# Patient Record
Sex: MALE | Race: WHITE | NOT HISPANIC OR LATINO | Employment: OTHER | URBAN - METROPOLITAN AREA
[De-identification: names, ages, dates, MRNs, and addresses within clinical notes are randomized per-mention and may not be internally consistent; named-entity substitution may affect disease eponyms.]

---

## 2020-02-19 ENCOUNTER — HOSPITAL ENCOUNTER (OUTPATIENT)
Dept: OTHER | Facility: HOSPITAL | Age: 75
Discharge: HOME | End: 2020-02-19
Attending: FAMILY MEDICINE

## 2020-02-26 ENCOUNTER — OFFICE (OUTPATIENT)
Dept: RURAL CLINIC 3 | Facility: CLINIC | Age: 75
End: 2020-02-26

## 2020-02-26 VITALS
HEIGHT: 68 IN | HEART RATE: 64 BPM | SYSTOLIC BLOOD PRESSURE: 137 MMHG | DIASTOLIC BLOOD PRESSURE: 75 MMHG | WEIGHT: 194 LBS

## 2020-02-26 DIAGNOSIS — R14.0 ABDOMINAL DISTENSION (GASEOUS): ICD-10-CM

## 2020-02-26 DIAGNOSIS — R19.4 CHANGE IN BOWEL HABIT: ICD-10-CM

## 2020-02-26 DIAGNOSIS — K21.9 GASTRO-ESOPHAGEAL REFLUX DISEASE WITHOUT ESOPHAGITIS: ICD-10-CM

## 2020-02-26 DIAGNOSIS — I10 ESSENTIAL (PRIMARY) HYPERTENSION: ICD-10-CM

## 2020-02-26 DIAGNOSIS — R07.9 CHEST PAIN, UNSPECIFIED: ICD-10-CM

## 2020-02-26 PROCEDURE — 99203 OFFICE O/P NEW LOW 30 MIN: CPT | Performed by: INTERNAL MEDICINE

## 2020-05-19 ENCOUNTER — TELEHEALTH PROVIDED OTHER THAN IN PATIENT'S HOME (OUTPATIENT)
Dept: URBAN - METROPOLITAN AREA TELEHEALTH 4 | Facility: TELEHEALTH | Age: 75
End: 2020-05-19

## 2020-05-19 DIAGNOSIS — B96.81 HELICOBACTER PYLORI [H. PYLORI] AS THE CAUSE OF DISEASES CLA: ICD-10-CM

## 2020-05-19 DIAGNOSIS — K21.9 GASTRO-ESOPHAGEAL REFLUX DISEASE WITHOUT ESOPHAGITIS: ICD-10-CM

## 2020-05-19 PROCEDURE — G2012 BRIEF CHECK IN BY MD/QHP: HCPCS | Performed by: INTERNAL MEDICINE

## 2020-10-12 ENCOUNTER — HOSPITAL ENCOUNTER (OUTPATIENT)
Dept: OTHER | Facility: HOSPITAL | Age: 75
Discharge: HOME | End: 2020-10-12
Attending: NURSE PRACTITIONER

## 2020-12-16 ENCOUNTER — HOSPITAL ENCOUNTER (OUTPATIENT)
Dept: OTHER | Facility: HOSPITAL | Age: 75
Discharge: HOME | End: 2020-12-16
Attending: OTOLARYNGOLOGY

## 2021-04-27 ENCOUNTER — HOSPITAL ENCOUNTER (OUTPATIENT)
Dept: OTHER | Facility: HOSPITAL | Age: 76
Discharge: HOME | End: 2021-04-27
Attending: FAMILY MEDICINE

## 2021-04-28 ENCOUNTER — HOSPITAL ENCOUNTER (OUTPATIENT)
Dept: OTHER | Facility: HOSPITAL | Age: 76
Discharge: HOME | End: 2021-04-28
Attending: FAMILY MEDICINE

## 2021-05-04 ENCOUNTER — TELEPHONE (OUTPATIENT)
Dept: NEUROSURGERY | Facility: CLINIC | Age: 76
End: 2021-05-04

## 2021-05-10 ENCOUNTER — TELEPHONE (OUTPATIENT)
Dept: NEUROSURGERY | Facility: CLINIC | Age: 76
End: 2021-05-10

## 2021-05-10 NOTE — TELEPHONE ENCOUNTER
S/W Mrs. Ayala and informed her that Mr. Ayala has an appointment with Dr. Dubon on 06.22.2021 at 12:45pm    ----- Message from Lionel Dubon MD sent at 5/10/2021 11:28 AM EDT -----  Regarding: RE: Review  Okay with me to see him but it may need to be next available.  ----- Message -----  From: Sindi Hassan MA  Sent: 5/10/2021   9:34 AM EDT  To: Lionel Dubon MD  Subject: FW: Review                                       Dr. Dubon please advise  ----- Message -----  From: Kay Curran MA  Sent: 5/10/2021   9:15 AM EDT  To: Sindi Hassan MA  Subject: Review                                           Dx: Other intervertebral disc displacement, lumbar region    Last seen 08-05-15    Lumbar MRI @ [facility] 04-27-21

## 2021-06-21 ENCOUNTER — TELEPHONE (OUTPATIENT)
Dept: NEUROSURGERY | Facility: CLINIC | Age: 76
End: 2021-06-21

## 2021-06-21 NOTE — TELEPHONE ENCOUNTER
S/W Mrs. Ayala and informed her that they need to bring a disc from [facility] with Mr. Ayala's MRI on it to his appointment with Dr. Dubon on 06.22.2021

## 2021-06-21 NOTE — PROGRESS NOTES
Subjective   Patient ID: Lacho Ayala is a 76 y.o. male is being seen for consultation today at the request of Iker Artis MD for lumbar intervertebral disc displacement. Patient had a MRI Lumbar on 04.27.2021 at [facility].     Treatment: no recent treatment    Today patient states that he was previously in severe pain, but the pain has subsided. Patient denies weakness in B/L lower extremities.     Patient, Provider, and MA are all wearing a mask in our office today.     History of Present Illness    This patient has been having pain in his back with radiation into his right leg.  This problem originally began at the beginning of April.  Subsequent to that he had really severe pain for several weeks.  About mid to late May the pain began to get better and now he is not having as much pain.  He initially was walking quite a bit but has not been walking as much here recently because he does not want to bring the pain back.  He does note that if he takes it easy he does not have very much pain.  When the pain is severe it was in his right lower back and radiated into his right leg along the medial thigh medial calf down to the foot.  The left leg was okay.  He has no difficulty with bowel bladder control or other associated symptoms.  He has had no specific treatment so far.    The following portions of the patient's history were reviewed and updated as appropriate: allergies, current medications, past family history, past medical history, past social history, past surgical history and problem list.    Review of Systems   Constitutional: Negative for chills and fever.   HENT: Negative for congestion.    Genitourinary: Negative for difficulty urinating and dysuria.   Musculoskeletal: Positive for back pain. Negative for gait problem and myalgias.   Neurological: Negative for weakness and numbness.       I have reviewed the review of systems as documented by my MA.      Objective     Vitals:    06/22/21  "1157   BP: 140/82   Cuff Size: Adult   Pulse: 84   Temp: 97.8 °F (36.6 °C)   Weight: 80.7 kg (178 lb)   Height: 172.7 cm (68\")     Body mass index is 27.06 kg/m².      Physical Exam  Constitutional:       Appearance: He is well-developed.   HENT:      Head: Normocephalic and atraumatic.   Eyes:      Extraocular Movements: EOM normal.      Conjunctiva/sclera: Conjunctivae normal.      Pupils: Pupils are equal, round, and reactive to light.   Neck:      Vascular: No carotid bruit.   Neurological:      Mental Status: He is oriented to person, place, and time.      Coordination: Finger-Nose-Finger Test and Heel to Shin Test normal.      Gait: Gait is intact.      Deep Tendon Reflexes:      Reflex Scores:       Tricep reflexes are 2+ on the right side and 2+ on the left side.       Bicep reflexes are 2+ on the right side and 2+ on the left side.       Brachioradialis reflexes are 2+ on the right side and 2+ on the left side.       Patellar reflexes are 2+ on the right side and 2+ on the left side.       Achilles reflexes are 2+ on the right side and 2+ on the left side.  Psychiatric:         Speech: Speech normal.       Neurologic Exam     Mental Status   Oriented to person, place, and time.   Registration of memory: Good recent and remote memory.   Attention: normal. Concentration: normal.   Speech: speech is normal   Level of consciousness: alert  Knowledge: consistent with education.     Cranial Nerves     CN II   Visual fields full to confrontation.   Visual acuity: normal    CN III, IV, VI   Pupils are equal, round, and reactive to light.  Extraocular motions are normal.     CN V   Facial sensation intact.   Right corneal reflex: normal  Left corneal reflex: normal    CN VII   Facial expression full, symmetric.   Right facial weakness: none  Left facial weakness: none    CN VIII   Hearing: intact    CN IX, X   Palate: symmetric    CN XI   Right sternocleidomastoid strength: normal  Left sternocleidomastoid strength: " normal    CN XII   Tongue: not atrophic  Tongue deviation: none    Motor Exam   Muscle bulk: normal  Right arm tone: normal  Left arm tone: normal  Right leg tone: normal  Left leg tone: normal    Strength   Strength 5/5 except as noted.     Sensory Exam   Light touch normal.     Gait, Coordination, and Reflexes     Gait  Gait: normal    Coordination   Finger to nose coordination: normal  Heel to shin coordination: normal    Reflexes   Right brachioradialis: 2+  Left brachioradialis: 2+  Right biceps: 2+  Left biceps: 2+  Right triceps: 2+  Left triceps: 2+  Right patellar: 2+  Left patellar: 2+  Right achilles: 2+  Left achilles: 2+  Right : 2+  Left : 2+          Assessment/Plan   Independent Review of Radiographic Studies:      I personally reviewed the images from the following studies.    I reviewed his MRI of the lumbar spine done on April 27 of this year.  This shows a widely patent canal and neuroforamina at L4-5 and L5-S1.  L5-S1 appears to be fused.  At L3-4 there is severe stenosis with a right-sided herniated disc.  L2-3 and L1-2 are both widely open.    Medical Decision Making:      I told the patient about the imaging.  I told him we could certainly consider surgery but I would really recommend trying some lumbar PT since he is feeling better overall anyway.  He is in agreement with that plan and we will have him call if the therapy makes him any worse.  If it does then we may have to consider surgery or possibly even a myelogram.    Diagnoses and all orders for this visit:    1. Neuritis or radiculitis due to rupture of lumbar intervertebral disc (Primary)  -     Ambulatory Referral to Physical Therapy      Return for Recheck and call after treatment or consultation.

## 2021-06-22 ENCOUNTER — OFFICE VISIT (OUTPATIENT)
Dept: NEUROSURGERY | Facility: CLINIC | Age: 76
End: 2021-06-22

## 2021-06-22 VITALS
BODY MASS INDEX: 26.98 KG/M2 | DIASTOLIC BLOOD PRESSURE: 82 MMHG | HEART RATE: 84 BPM | TEMPERATURE: 97.8 F | HEIGHT: 68 IN | SYSTOLIC BLOOD PRESSURE: 140 MMHG | WEIGHT: 178 LBS

## 2021-06-22 DIAGNOSIS — M51.16 NEURITIS OR RADICULITIS DUE TO RUPTURE OF LUMBAR INTERVERTEBRAL DISC: Primary | ICD-10-CM

## 2021-06-22 PROCEDURE — 99203 OFFICE O/P NEW LOW 30 MIN: CPT | Performed by: NEUROLOGICAL SURGERY

## 2021-06-22 RX ORDER — BENAZEPRIL HYDROCHLORIDE 10 MG/1
10 TABLET ORAL DAILY
COMMUNITY
Start: 2021-03-31

## 2021-06-22 RX ORDER — HYDROCODONE BITARTRATE AND ACETAMINOPHEN 5; 325 MG/1; MG/1
1 TABLET ORAL EVERY 8 HOURS PRN
COMMUNITY
Start: 2021-04-27 | End: 2021-10-18

## 2021-06-22 RX ORDER — PREDNISONE 20 MG/1
TABLET ORAL
COMMUNITY
Start: 2021-04-21 | End: 2021-10-18

## 2021-06-22 RX ORDER — ROSUVASTATIN CALCIUM 10 MG/1
10 TABLET, COATED ORAL DAILY
COMMUNITY
Start: 2021-03-31

## 2021-06-22 RX ORDER — AMLODIPINE BESYLATE 5 MG/1
5 TABLET ORAL DAILY
COMMUNITY
Start: 2021-03-31

## 2021-06-22 RX ORDER — FLUTICASONE PROPIONATE 50 MCG
1 SPRAY, SUSPENSION (ML) NASAL DAILY
COMMUNITY
End: 2021-10-18

## 2021-06-22 RX ORDER — MONTELUKAST SODIUM 10 MG/1
10 TABLET ORAL DAILY
COMMUNITY
Start: 2021-04-21

## 2021-10-18 ENCOUNTER — OFFICE VISIT (OUTPATIENT)
Dept: NEUROLOGY | Facility: CLINIC | Age: 76
End: 2021-10-18

## 2021-10-18 VITALS
WEIGHT: 188.8 LBS | DIASTOLIC BLOOD PRESSURE: 79 MMHG | BODY MASS INDEX: 28.61 KG/M2 | HEIGHT: 68 IN | HEART RATE: 79 BPM | SYSTOLIC BLOOD PRESSURE: 127 MMHG

## 2021-10-18 DIAGNOSIS — R51.9 CHRONIC INTRACTABLE HEADACHE, UNSPECIFIED HEADACHE TYPE: Primary | ICD-10-CM

## 2021-10-18 DIAGNOSIS — G89.29 CHRONIC INTRACTABLE HEADACHE, UNSPECIFIED HEADACHE TYPE: Primary | ICD-10-CM

## 2021-10-18 DIAGNOSIS — H57.12 RETRO-ORBITAL PAIN OF LEFT EYE: ICD-10-CM

## 2021-10-18 PROCEDURE — 99205 OFFICE O/P NEW HI 60 MIN: CPT | Performed by: NURSE PRACTITIONER

## 2021-10-18 RX ORDER — MULTIVITAMIN WITH IRON
1 TABLET ORAL DAILY
COMMUNITY

## 2021-10-18 RX ORDER — CETIRIZINE HYDROCHLORIDE 10 MG/1
10 TABLET ORAL DAILY
COMMUNITY

## 2021-10-18 RX ORDER — ASPIRIN 81 MG/1
81 TABLET ORAL DAILY
COMMUNITY
End: 2022-03-07

## 2021-10-18 RX ORDER — AMITRIPTYLINE HYDROCHLORIDE 25 MG/1
25 TABLET, FILM COATED ORAL NIGHTLY
Qty: 30 TABLET | Refills: 3 | Status: SHIPPED | OUTPATIENT
Start: 2021-10-18 | End: 2021-12-07 | Stop reason: SDUPTHER

## 2021-10-18 RX ORDER — OMEPRAZOLE 20 MG/1
20 TABLET, DELAYED RELEASE ORAL DAILY
COMMUNITY
End: 2022-04-11 | Stop reason: HOSPADM

## 2021-10-18 NOTE — PROGRESS NOTES
"Chief Complaint  Neurologic Problem (Cephalgia/has tried Topirimate and Propranolol)    Subjective          Lacho Ayala presents to [facility] NEUROLOGY & NEUROSURGERY  Having left sided headache for about the past year.  States that it is often retro-orbital and feels like pressure and sharp.  Occasionally will have a holocranial headache.  Denies photophobia, phonophobia or nausea.  Endorses fatigue.  Has history of RA. States he's having headache most days out of the week.  Uses Tylenol for abortive therapy.        Objective   Vital Signs:   /79   Pulse 79   Ht 172.7 cm (68\")   Wt 85.6 kg (188 lb 12.8 oz)   BMI 28.71 kg/m²     Physical Exam  HENT:      Head: Normocephalic.   Pulmonary:      Effort: Pulmonary effort is normal.   Neurological:      Mental Status: He is alert and oriented to person, place, and time.      Cranial Nerves: Cranial nerves are intact.      Sensory: Sensation is intact.      Motor: Motor function is intact.      Coordination: Coordination is intact.      Deep Tendon Reflexes: Reflexes are normal and symmetric.        Neurologic Exam     Mental Status   Oriented to person, place, and time.        Result Review :             MRI Brain 10/12/2020: Mild chronic small vessel change.     CT Maxillofacial: No appreciable orbit abnormality.         No clearly acute finding in the face.         Minimal opacity in a single right ethmoid air cell.  Otherwise the paranasal sinuses and mastoid     air cells are clear.      Assessment and Plan    Diagnoses and all orders for this visit:    1. Chronic intractable headache, unspecified headache type (Primary)  Assessment & Plan:  Will order MRI oribits to further evaluate retro-orbital pain.  Will start amitriptyline for preventative therapy of headache. Discussed potential side effects in great detail.       2. Retro-orbital pain of left eye  -     MRI brachial plexus face orbits neck w wo contrast; Future    Other " orders  -     amitriptyline (ELAVIL) 25 MG tablet; Take 1 tablet by mouth Every Night.  Dispense: 30 tablet; Refill: 3    I spent 60 minutes caring for Lacho on this date of service. This time includes time spent by me in the following activities:preparing for the visit, reviewing tests, obtaining and/or reviewing a separately obtained history, performing a medically appropriate examination and/or evaluation , counseling and educating the patient/family/caregiver, ordering medications, tests, or procedures, documenting information in the medical record and independently interpreting results and communicating that information with the patient/family/caregiver  Follow Up   Return in about 6 weeks (around 11/29/2021) for headache.  Patient was given instructions and counseling regarding his condition or for health maintenance advice. Please see specific information pulled into the AVS if appropriate.

## 2021-10-21 PROBLEM — R51.9 CHRONIC INTRACTABLE HEADACHE: Status: ACTIVE | Noted: 2021-10-21

## 2021-10-21 PROBLEM — H57.12 RETRO-ORBITAL PAIN OF LEFT EYE: Status: ACTIVE | Noted: 2021-10-21

## 2021-10-21 PROBLEM — G89.29 CHRONIC INTRACTABLE HEADACHE: Status: ACTIVE | Noted: 2021-10-21

## 2021-10-21 NOTE — ASSESSMENT & PLAN NOTE
Will order MRI oribits to further evaluate retro-orbital pain.  Will start amitriptyline for preventative therapy of headache. Discussed potential side effects in great detail.

## 2021-11-04 ENCOUNTER — HOSPITAL ENCOUNTER (OUTPATIENT)
Dept: MRI IMAGING | Facility: HOSPITAL | Age: 76
Discharge: HOME | End: 2021-11-04
Admitting: NURSE PRACTITIONER

## 2021-11-04 DIAGNOSIS — H57.12 RETRO-ORBITAL PAIN OF LEFT EYE: ICD-10-CM

## 2021-11-04 LAB — CREAT BLDA-MCNC: 1 MG/DL

## 2021-11-04 PROCEDURE — 0 GADOBENATE DIMEGLUMINE 529 MG/ML SOLUTION: Performed by: NURSE PRACTITIONER

## 2021-11-04 PROCEDURE — 82565 ASSAY OF CREATININE: CPT

## 2021-11-04 PROCEDURE — 70543 MRI ORBT/FAC/NCK W/O &W/DYE: CPT

## 2021-11-04 PROCEDURE — A9577 INJ MULTIHANCE: HCPCS | Performed by: NURSE PRACTITIONER

## 2021-11-04 RX ADMIN — GADOBENATE DIMEGLUMINE 17 ML: 529 INJECTION, SOLUTION INTRAVENOUS at 09:35

## 2021-12-07 ENCOUNTER — OFFICE VISIT (OUTPATIENT)
Dept: NEUROLOGY | Facility: CLINIC | Age: 76
End: 2021-12-07

## 2021-12-07 VITALS
SYSTOLIC BLOOD PRESSURE: 137 MMHG | HEART RATE: 90 BPM | BODY MASS INDEX: 28.37 KG/M2 | DIASTOLIC BLOOD PRESSURE: 97 MMHG | WEIGHT: 187.2 LBS | HEIGHT: 68 IN

## 2021-12-07 DIAGNOSIS — H57.12 RETRO-ORBITAL PAIN OF LEFT EYE: ICD-10-CM

## 2021-12-07 DIAGNOSIS — R51.9 CHRONIC INTRACTABLE HEADACHE, UNSPECIFIED HEADACHE TYPE: Primary | ICD-10-CM

## 2021-12-07 DIAGNOSIS — G89.29 CHRONIC INTRACTABLE HEADACHE, UNSPECIFIED HEADACHE TYPE: Primary | ICD-10-CM

## 2021-12-07 PROCEDURE — 99214 OFFICE O/P EST MOD 30 MIN: CPT | Performed by: NURSE PRACTITIONER

## 2021-12-07 RX ORDER — AMITRIPTYLINE HYDROCHLORIDE 25 MG/1
25 TABLET, FILM COATED ORAL NIGHTLY
Qty: 90 TABLET | Refills: 1 | Status: SHIPPED | OUTPATIENT
Start: 2021-12-07 | End: 2022-07-07 | Stop reason: SDUPTHER

## 2021-12-07 RX ORDER — VITAMIN B COMPLEX
1 TABLET ORAL DAILY
COMMUNITY

## 2021-12-07 NOTE — PROGRESS NOTES
"Chief Complaint  Neurologic Problem (MRI review)    Subjective          Lacho Ayala presents to [facility] NEUROLOGY & NEUROSURGERY  States that headaches have improved.  Having about 8 headache days per month currently.  Did take nortriptyline for 1 month and felt that he had improvement, but hasn't been taking it for the last 3 weeks and feels headache frequency is increasing.      Interval History:  Having left sided headache for about the past year.  States that it is often retro-orbital and feels like pressure and sharp.  Occasionally will have a holocranial headache.  Denies photophobia, phonophobia or nausea.  Endorses fatigue.  Has history of RA. States he's having headache most days out of the week.  Uses Tylenol for abortive therapy.        Objective   Vital Signs:   /97   Pulse 90   Ht 172.7 cm (68\")   Wt 84.9 kg (187 lb 3.2 oz)   BMI 28.46 kg/m²     Physical Exam  HENT:      Head: Normocephalic.   Pulmonary:      Effort: Pulmonary effort is normal.   Neurological:      Mental Status: He is alert and oriented to person, place, and time.      Cranial Nerves: Cranial nerves are intact.      Sensory: Sensation is intact.      Motor: Motor function is intact.      Coordination: Coordination is intact.      Deep Tendon Reflexes: Reflexes are normal and symmetric.        Neurologic Exam     Mental Status   Oriented to person, place, and time.        Result Review :             MRI Face and Orbits (independently reviewed):   Normal    Assessment and Plan    Diagnoses and all orders for this visit:    1. Chronic intractable headache, unspecified headache type (Primary)  Assessment & Plan:  Will restart nortriptyline for preventative therapy of migraine.       2. Retro-orbital pain of left eye    Other orders  -     amitriptyline (ELAVIL) 25 MG tablet; Take 1 tablet by mouth Every Night.  Dispense: 90 tablet; Refill: 1      Follow Up   Return in about 3 months (around " 3/7/2022) for Migraine f/u.  Patient was given instructions and counseling regarding his condition or for health maintenance advice. Please see specific information pulled into the AVS if appropriate.

## 2022-03-07 ENCOUNTER — OFFICE VISIT (OUTPATIENT)
Dept: NEUROLOGY | Facility: CLINIC | Age: 77
End: 2022-03-07

## 2022-03-07 VITALS
SYSTOLIC BLOOD PRESSURE: 127 MMHG | WEIGHT: 192.5 LBS | DIASTOLIC BLOOD PRESSURE: 77 MMHG | HEART RATE: 66 BPM | BODY MASS INDEX: 29.18 KG/M2 | HEIGHT: 68 IN

## 2022-03-07 DIAGNOSIS — H57.12 RETRO-ORBITAL PAIN OF LEFT EYE: ICD-10-CM

## 2022-03-07 DIAGNOSIS — R51.9 CHRONIC INTRACTABLE HEADACHE, UNSPECIFIED HEADACHE TYPE: Primary | ICD-10-CM

## 2022-03-07 DIAGNOSIS — G89.29 CHRONIC INTRACTABLE HEADACHE, UNSPECIFIED HEADACHE TYPE: Primary | ICD-10-CM

## 2022-03-07 PROCEDURE — 99213 OFFICE O/P EST LOW 20 MIN: CPT | Performed by: NURSE PRACTITIONER

## 2022-03-07 RX ORDER — ASPIRIN 81 MG/1
81 TABLET ORAL
COMMUNITY

## 2022-03-07 RX ORDER — AMITRIPTYLINE HYDROCHLORIDE 25 MG/1
25 TABLET, FILM COATED ORAL NIGHTLY
Qty: 90 TABLET | Refills: 1 | Status: CANCELLED | OUTPATIENT
Start: 2022-03-07

## 2022-03-07 NOTE — ASSESSMENT & PLAN NOTE
Can d/c amitriptyline.  If headaches return, he can decide whether he would like to restart the medication.

## 2022-03-07 NOTE — PROGRESS NOTES
"Chief Complaint  Neurologic Problem (HA/Orbital pain )    Subjective          Lacho Ayala presents to [facility] NEUROLOGY & NEUROSURGERY  States that headaches are significantly improved on amitriptyline.  However, he is experiencing negative sexual side effects.  He would like to taper medication and see if headaches return.      Interval History:  Having left sided headache for about the past year.  States that it is often retro-orbital and feels like pressure and sharp.  Occasionally will have a holocranial headache.  Denies photophobia, phonophobia or nausea.  Endorses fatigue.  Has history of RA. States he's having headache most days out of the week.  Uses Tylenol for abortive therapy.        Objective   Vital Signs:   /77   Pulse 66   Ht 172.7 cm (68\")   Wt 87.3 kg (192 lb 8 oz)   BMI 29.27 kg/m²     Physical Exam  HENT:      Head: Normocephalic.   Pulmonary:      Effort: Pulmonary effort is normal.   Neurological:      Mental Status: He is alert and oriented to person, place, and time.      Cranial Nerves: Cranial nerves are intact.      Sensory: Sensation is intact.      Motor: Motor function is intact.      Coordination: Coordination is intact.      Deep Tendon Reflexes: Reflexes are normal and symmetric.        Neurologic Exam     Mental Status   Oriented to person, place, and time.        Result Review :{Labs  Result Review  Imaging  Med Tab  Media  Procedures :23}               Assessment and Plan    Diagnoses and all orders for this visit:    1. Chronic intractable headache, unspecified headache type (Primary)    2. Retro-orbital pain of left eye        Follow Up   Return in about 4 months (around 7/7/2022) for headache.  Patient was given instructions and counseling regarding his condition or for health maintenance advice. Please see specific information pulled into the AVS if appropriate.       "

## 2022-04-09 ENCOUNTER — HOSPITAL ENCOUNTER (OUTPATIENT)
Facility: HOSPITAL | Age: 77
Setting detail: OBSERVATION
Discharge: HOME | End: 2022-04-11
Attending: INTERNAL MEDICINE | Admitting: STUDENT IN AN ORGANIZED HEALTH CARE EDUCATION/TRAINING PROGRAM

## 2022-04-09 ENCOUNTER — APPOINTMENT (OUTPATIENT)
Dept: OTHER | Facility: HOSPITAL | Age: 77
End: 2022-04-09

## 2022-04-09 ENCOUNTER — APPOINTMENT (OUTPATIENT)
Dept: GENERAL RADIOLOGY | Facility: HOSPITAL | Age: 77
End: 2022-04-09

## 2022-04-09 PROBLEM — M15.9 PRIMARY OSTEOARTHRITIS INVOLVING MULTIPLE JOINTS: Status: ACTIVE | Noted: 2022-04-09

## 2022-04-09 PROBLEM — K56.41 FECAL IMPACTION (HCC): Status: ACTIVE | Noted: 2022-04-09

## 2022-04-09 PROBLEM — E78.2 MIXED HYPERLIPIDEMIA: Status: ACTIVE | Noted: 2022-04-09

## 2022-04-09 PROBLEM — I10 PRIMARY HYPERTENSION: Status: ACTIVE | Noted: 2022-04-09

## 2022-04-09 PROBLEM — G47.33 OBSTRUCTIVE SLEEP APNEA: Status: ACTIVE | Noted: 2022-04-09

## 2022-04-09 PROBLEM — M15.0 PRIMARY OSTEOARTHRITIS INVOLVING MULTIPLE JOINTS: Status: ACTIVE | Noted: 2022-04-09

## 2022-04-09 PROBLEM — K56.609 SBO (SMALL BOWEL OBSTRUCTION): Status: ACTIVE | Noted: 2022-04-09

## 2022-04-09 LAB
ALBUMIN SERPL-MCNC: 4.3 G/DL (ref 3.5–5.2)
ALBUMIN/GLOB SERPL: 1.5 G/DL
ALP SERPL-CCNC: 86 U/L (ref 39–117)
ALT SERPL W P-5'-P-CCNC: 15 U/L (ref 1–41)
ANION GAP SERPL CALCULATED.3IONS-SCNC: 14 MMOL/L (ref 5–15)
AST SERPL-CCNC: 24 U/L (ref 1–40)
BASOPHILS # BLD AUTO: 0.1 10*3/MM3 (ref 0–0.2)
BASOPHILS NFR BLD AUTO: 0.8 % (ref 0–1.5)
BILIRUB SERPL-MCNC: 0.6 MG/DL (ref 0–1.2)
BUN SERPL-MCNC: 9 MG/DL (ref 8–23)
BUN/CREAT SERPL: 8.3 (ref 7–25)
CALCIUM SPEC-SCNC: 9.4 MG/DL (ref 8.6–10.5)
CHLORIDE SERPL-SCNC: 101 MMOL/L (ref 98–107)
CO2 SERPL-SCNC: 22 MMOL/L (ref 22–29)
CREAT SERPL-MCNC: 1.08 MG/DL (ref 0.76–1.27)
DEPRECATED RDW RBC AUTO: 41.1 FL (ref 37–54)
EGFRCR SERPLBLD CKD-EPI 2021: 70.2 ML/MIN/1.73
EOSINOPHIL # BLD AUTO: 0.1 10*3/MM3 (ref 0–0.4)
EOSINOPHIL NFR BLD AUTO: 0.7 % (ref 0.3–6.2)
ERYTHROCYTE [DISTWIDTH] IN BLOOD BY AUTOMATED COUNT: 13.2 % (ref 12.3–15.4)
GLOBULIN UR ELPH-MCNC: 2.8 GM/DL
GLUCOSE BLDC GLUCOMTR-MCNC: 99 MG/DL (ref 70–105)
GLUCOSE SERPL-MCNC: 94 MG/DL (ref 65–99)
HCT VFR BLD AUTO: 43.7 % (ref 37.5–51)
HGB BLD-MCNC: 15.7 G/DL (ref 13–17.7)
LYMPHOCYTES # BLD AUTO: 1.8 10*3/MM3 (ref 0.7–3.1)
LYMPHOCYTES NFR BLD AUTO: 17.4 % (ref 19.6–45.3)
MAGNESIUM SERPL-MCNC: 2 MG/DL (ref 1.6–2.4)
MCH RBC QN AUTO: 32.5 PG (ref 26.6–33)
MCHC RBC AUTO-ENTMCNC: 36 G/DL (ref 31.5–35.7)
MCV RBC AUTO: 90.3 FL (ref 79–97)
MONOCYTES # BLD AUTO: 0.9 10*3/MM3 (ref 0.1–0.9)
MONOCYTES NFR BLD AUTO: 9.2 % (ref 5–12)
NEUTROPHILS NFR BLD AUTO: 7.3 10*3/MM3 (ref 1.7–7)
NEUTROPHILS NFR BLD AUTO: 71.9 % (ref 42.7–76)
NRBC BLD AUTO-RTO: 0.1 /100 WBC (ref 0–0.2)
PLATELET # BLD AUTO: 208 10*3/MM3 (ref 140–450)
PMV BLD AUTO: 8.3 FL (ref 6–12)
POTASSIUM SERPL-SCNC: 4.4 MMOL/L (ref 3.5–5.2)
PROT SERPL-MCNC: 7.1 G/DL (ref 6–8.5)
RBC # BLD AUTO: 4.84 10*6/MM3 (ref 4.14–5.8)
SODIUM SERPL-SCNC: 137 MMOL/L (ref 136–145)
WBC NRBC COR # BLD: 10.2 10*3/MM3 (ref 3.4–10.8)

## 2022-04-09 PROCEDURE — 74018 RADEX ABDOMEN 1 VIEW: CPT

## 2022-04-09 PROCEDURE — G0379 DIRECT REFER HOSPITAL OBSERV: HCPCS

## 2022-04-09 PROCEDURE — 85025 COMPLETE CBC W/AUTO DIFF WBC: CPT | Performed by: INTERNAL MEDICINE

## 2022-04-09 PROCEDURE — G0378 HOSPITAL OBSERVATION PER HR: HCPCS

## 2022-04-09 PROCEDURE — 99220 PR INITIAL OBSERVATION CARE/DAY 70 MINUTES: CPT | Performed by: INTERNAL MEDICINE

## 2022-04-09 PROCEDURE — 83735 ASSAY OF MAGNESIUM: CPT | Performed by: INTERNAL MEDICINE

## 2022-04-09 PROCEDURE — 82962 GLUCOSE BLOOD TEST: CPT

## 2022-04-09 PROCEDURE — 80053 COMPREHEN METABOLIC PANEL: CPT | Performed by: INTERNAL MEDICINE

## 2022-04-09 RX ORDER — ACETAMINOPHEN 325 MG/1
650 TABLET ORAL EVERY 4 HOURS PRN
Status: DISCONTINUED | OUTPATIENT
Start: 2022-04-09 | End: 2022-04-11 | Stop reason: HOSPADM

## 2022-04-09 RX ORDER — SODIUM CHLORIDE 0.9 % (FLUSH) 0.9 %
10 SYRINGE (ML) INJECTION AS NEEDED
Status: DISCONTINUED | OUTPATIENT
Start: 2022-04-09 | End: 2022-04-11 | Stop reason: HOSPADM

## 2022-04-09 RX ORDER — BISACODYL 10 MG
10 SUPPOSITORY, RECTAL RECTAL DAILY PRN
Status: DISCONTINUED | OUTPATIENT
Start: 2022-04-09 | End: 2022-04-11 | Stop reason: HOSPADM

## 2022-04-09 RX ORDER — ACETAMINOPHEN 160 MG/5ML
650 SOLUTION ORAL EVERY 4 HOURS PRN
Status: DISCONTINUED | OUTPATIENT
Start: 2022-04-09 | End: 2022-04-11 | Stop reason: HOSPADM

## 2022-04-09 RX ORDER — POLYETHYLENE GLYCOL 3350 17 G/17G
17 POWDER, FOR SOLUTION ORAL DAILY PRN
Status: DISCONTINUED | OUTPATIENT
Start: 2022-04-09 | End: 2022-04-11 | Stop reason: HOSPADM

## 2022-04-09 RX ORDER — SODIUM CHLORIDE 9 MG/ML
100 INJECTION, SOLUTION INTRAVENOUS CONTINUOUS
Status: DISCONTINUED | OUTPATIENT
Start: 2022-04-09 | End: 2022-04-11

## 2022-04-09 RX ORDER — OXYCODONE HYDROCHLORIDE 5 MG/1
10 TABLET ORAL EVERY 4 HOURS PRN
Status: DISCONTINUED | OUTPATIENT
Start: 2022-04-09 | End: 2022-04-11 | Stop reason: HOSPADM

## 2022-04-09 RX ORDER — ALUMINA, MAGNESIA, AND SIMETHICONE 2400; 2400; 240 MG/30ML; MG/30ML; MG/30ML
15 SUSPENSION ORAL EVERY 6 HOURS PRN
Status: DISCONTINUED | OUTPATIENT
Start: 2022-04-09 | End: 2022-04-11 | Stop reason: HOSPADM

## 2022-04-09 RX ORDER — ONDANSETRON 4 MG/1
4 TABLET, FILM COATED ORAL EVERY 6 HOURS PRN
Status: DISCONTINUED | OUTPATIENT
Start: 2022-04-09 | End: 2022-04-11 | Stop reason: HOSPADM

## 2022-04-09 RX ORDER — SODIUM CHLORIDE 0.9 % (FLUSH) 0.9 %
10 SYRINGE (ML) INJECTION EVERY 12 HOURS SCHEDULED
Status: DISCONTINUED | OUTPATIENT
Start: 2022-04-09 | End: 2022-04-11 | Stop reason: HOSPADM

## 2022-04-09 RX ORDER — ONDANSETRON 2 MG/ML
4 INJECTION INTRAMUSCULAR; INTRAVENOUS EVERY 6 HOURS PRN
Status: DISCONTINUED | OUTPATIENT
Start: 2022-04-09 | End: 2022-04-11 | Stop reason: HOSPADM

## 2022-04-09 RX ORDER — ACETAMINOPHEN 650 MG/1
650 SUPPOSITORY RECTAL EVERY 4 HOURS PRN
Status: DISCONTINUED | OUTPATIENT
Start: 2022-04-09 | End: 2022-04-11 | Stop reason: HOSPADM

## 2022-04-09 RX ORDER — AMOXICILLIN 250 MG
2 CAPSULE ORAL 2 TIMES DAILY
Status: DISCONTINUED | OUTPATIENT
Start: 2022-04-09 | End: 2022-04-11 | Stop reason: HOSPADM

## 2022-04-09 RX ORDER — BISACODYL 5 MG/1
5 TABLET, DELAYED RELEASE ORAL DAILY PRN
Status: DISCONTINUED | OUTPATIENT
Start: 2022-04-09 | End: 2022-04-11 | Stop reason: HOSPADM

## 2022-04-09 RX ORDER — OXYCODONE HYDROCHLORIDE 5 MG/1
5 TABLET ORAL EVERY 4 HOURS PRN
Status: DISCONTINUED | OUTPATIENT
Start: 2022-04-09 | End: 2022-04-11 | Stop reason: HOSPADM

## 2022-04-09 RX ADMIN — SODIUM CHLORIDE 100 ML/HR: 9 INJECTION, SOLUTION INTRAVENOUS at 17:29

## 2022-04-09 RX ADMIN — Medication 10 ML: at 20:28

## 2022-04-09 RX ADMIN — SENNOSIDES AND DOCUSATE SODIUM 2 TABLET: 50; 8.6 TABLET ORAL at 20:28

## 2022-04-09 NOTE — H&P
[facility]      Patient Name: Lacho Ayala  : 1945  MRN: [id number]  Primary Care Physician:  Iker Artis MD  Date of admission: 2022      Subjective      Chief Complaint: Abdominal pain nausea and vomiting.    History of Present Illness: Lacho Ayala is a 77 y.o. male who presented to [facility] on 2022 complaining of abdominal pain nausea and vomiting.  Patient is a 77-year-old male with past medical history of GERD, DJD, osteoarthritis of multiple joints, hypertension, hyperlipidemia, shingles, obstructive sleep apnea and cataracts status post cataract extraction who transferred from [facility] emergency room because of diagnosis of a small bowel obstruction.  Patient reported onset of his symptoms on Friday with a nausea vomiting and abdominal pain.  Patient now complains of a liquid stools and decreasing the abdominal pain.  Patient was transferred to [facility] because there was no surgeon at [facility].  The emergency room physician at the [facility] discussed this case with Dr. Espinoza.  Patient now complains of having liquid stools and the KUB showed a large stool burden with the distended bowel loops clinically consistent with a SBO.      Patient reported no fever or chills, no headache or visual obscurations, no hot or cold intolerance, no leg swelling or leg or leg pain.      Review of Systems   Constitutional: Negative.   HENT: Negative.    Eyes: Negative.    Cardiovascular: Negative.    Respiratory: Negative.    Endocrine: Negative.    Hematologic/Lymphatic: Negative.    Skin: Negative.    Musculoskeletal: Negative.    Gastrointestinal: Positive for abdominal pain, constipation, diarrhea, nausea and vomiting.        Positive liquid stools.   Genitourinary: Negative.    Neurological: Negative.    Psychiatric/Behavioral: Negative.    Allergic/Immunologic: Negative.           Personal  History     Past Medical History:   Diagnosis Date   • Arthritis    • Cataract    • Hypertension    • Shingles    • Sleep apnea    Hyperlipidemia  DJD  GERD      Past Surgical History:   Procedure Laterality Date   • BACK SURGERY  1985   • CATARACT EXTRACTION     • GALLBLADDER SURGERY  2000   • HAND SURGERY Right 1967   • KNEE SURGERY Left 1990/1993       Family History: family history includes Alzheimer's disease in his father; Arthritis in his brother; Dementia in his brother and father; Diabetes in his brother and father; Heart disease in his mother; Hypertension in his mother. Otherwise pertinent FHx was reviewed and not pertinent to current issue.    Social History:  reports that he has quit smoking. He has never used smokeless tobacco. He reports that he does not drink alcohol.   No IV drug use.  Patient lives at home.    Home Medications:  Prior to Admission Medications     Prescriptions Last Dose Informant Patient Reported? Taking?    amitriptyline (ELAVIL) 25 MG tablet Past Week  No Yes    Take 1 tablet by mouth Every Night.    amLODIPine (NORVASC) 5 MG tablet 4/8/2022  Yes Yes    Take 5 mg by mouth Daily.    aspirin 81 MG EC tablet 4/8/2022  Yes Yes    Take 81 mg by mouth. Takes 3 days a week    B Complex Vitamins (Vitamin B-Complex) tablet 4/8/2022  Yes Yes    Take 1 capsule by mouth Daily.    benazepril (LOTENSIN) 10 MG tablet 4/8/2022  Yes Yes    Take 10 mg by mouth Daily.    Calcium Carbonate-Vitamin D (Calcium-Vitamin D) 600-125 MG-UNIT tablet 4/8/2022  Yes Yes    Take 1 tablet by mouth Daily.    cetirizine (zyrTEC) 10 MG tablet 4/8/2022  Yes Yes    Take 10 mg by mouth Daily.    Cholecalciferol 25 MCG (1000 UT) capsule 4/8/2022  Yes Yes    Take  by mouth.    FEXOFENADINE HCL PO 4/8/2022  Yes Yes    Take  by mouth Daily. Unknown dosage    Magnesium 250 MG tablet 4/8/2022  Yes Yes    Take 1 tablet by mouth Daily.    montelukast (SINGULAIR) 10 MG tablet 4/8/2022  Yes Yes    Take 10 mg by mouth Daily.     omeprazole OTC (PriLOSEC OTC) 20 MG EC tablet 4/8/2022  Yes Yes    Take 20 mg by mouth Daily.    POTASSIUM GLUCONATE PO 4/8/2022  Yes Yes    Take  by mouth Daily.    rosuvastatin (CRESTOR) 10 MG tablet 4/8/2022  Yes Yes    Take 10 mg by mouth Daily.            Allergies:  Allergies   Allergen Reactions   • Acetaminophen Unknown - Low Severity     Acetaminophen-propoxyphene   • Ibuprofen Unknown - Low Severity   • Other Unknown - Low Severity     Staflex   • Oxycodone-Aspirin Unknown - Low Severity     Percodan   • Penicillins Unknown - Low Severity   • Sulfa Antibiotics Unknown - Low Severity   • Vioxx [Rofecoxib] Unknown - Low Severity       Objective      Vitals:   Temp:  [97.7 °F (36.5 °C)] 97.7 °F (36.5 °C)  Heart Rate:  [67] 67  Resp:  [18] 18  BP: (144)/(85) 144/85    Physical Exam  Vitals and nursing note reviewed.   Constitutional:       General: He is not in acute distress.  HENT:      Head: Normocephalic.      Nose: Nose normal.      Mouth/Throat:      Mouth: Mucous membranes are dry.      Pharynx: Oropharynx is clear.   Eyes:      Extraocular Movements: Extraocular movements intact.      Conjunctiva/sclera: Conjunctivae normal.      Pupils: Pupils are equal, round, and reactive to light.   Cardiovascular:      Pulses: Normal pulses.      Heart sounds: No murmur heard.    No friction rub. No gallop.   Pulmonary:      Breath sounds: No stridor. No wheezing or rales.   Chest:      Chest wall: No tenderness.   Abdominal:      General: Bowel sounds are normal. There is no distension.      Palpations: Abdomen is soft.      Tenderness: There is abdominal tenderness. There is no right CVA tenderness or guarding.   Musculoskeletal:         General: No swelling, tenderness, deformity or signs of injury.      Cervical back: Normal range of motion. No rigidity.      Right lower leg: No edema.      Left lower leg: No edema.   Skin:     General: Skin is warm and dry.      Capillary Refill: Capillary refill takes less  than 2 seconds.      Coloration: Skin is not jaundiced.      Findings: No bruising, erythema, lesion or rash.   Neurological:      Comments: No facial asymmetry noted.  Gait and station not tested.   Psychiatric:      Comments: No agitation.              Result Review    Result Review:  I have personally reviewed the results from the time of this admission to 4/9/2022 18:37 EDT and agree with these findings:  [x]  Laboratory  [x]  Microbiology  [x]  Radiology  [x]  EKG/Telemetry   []  Cardiology/Vascular   []  Pathology  []  Old records  []  Other:  Most notable findings include:       Assessment/Plan        Active Hospital Problems:  Active Hospital Problems    Diagnosis    • SBO (small bowel obstruction) (HCC)  Follow general surgery recommendations.  Improving.      • Fecal impaction (HCC)  Treat with supportive care.      • Primary hypertension  Stable.      • Mixed hyperlipidemia  Treat with Lipitor.      • Primary osteoarthritis involving multiple joints  Treat with pain control, Tylenol.      • Obstructive sleep apnea  Treat with CPAP.          Resume appropriate patient's home medications for other chronic medical conditions.  Continue the present level of care.  Patient and family agreed with the plan of care.      DVT prophylaxis:  Mechanical DVT prophylaxis orders are present.    CODE STATUS:    Level Of Support Discussed With: Patient  Code Status (Patient has no pulse and is not breathing): CPR (Attempt to Resuscitate)  Medical Interventions (Patient has pulse or is breathing): Full Support    Admission Status:  I believe this patient meets obs. Status.    I discussed the patient's findings and my recommendations with patient, family, nursing staff, primary care team and consulting provider.    This patient has been examined wearing appropriate Personal Protective Equipment and discussed with hospital infection control department, [facility] department, infectious disease specialist and pulmonologist.  04/09/22      Signature: Electronically signed by Solis Wei MD, FACP,  04/09/22, 6:37 PM EDT.

## 2022-04-09 NOTE — PLAN OF CARE
Goal Outcome Evaluation:  Plan of Care Reviewed With: patient        Progress: improving  Outcome Evaluation: Pt admitted today from [facility]. 77year old male with complaint of abdominal pain over there. CT showed small bowel obstruction. Pt vomited once this morning, and has had diarrhea all day. Admission complete. Patient up ad tawanna. Will continue to monitor.

## 2022-04-10 ENCOUNTER — APPOINTMENT (OUTPATIENT)
Dept: GENERAL RADIOLOGY | Facility: HOSPITAL | Age: 77
End: 2022-04-10

## 2022-04-10 ENCOUNTER — ON CAMPUS - OUTPATIENT (OUTPATIENT)
Dept: URBAN - METROPOLITAN AREA HOSPITAL 85 | Facility: HOSPITAL | Age: 77
End: 2022-04-10
Payer: MEDICARE

## 2022-04-10 DIAGNOSIS — R10.9 UNSPECIFIED ABDOMINAL PAIN: ICD-10-CM

## 2022-04-10 DIAGNOSIS — R93.3 ABNORMAL FINDINGS ON DIAGNOSTIC IMAGING OF OTHER PARTS OF DI: ICD-10-CM

## 2022-04-10 DIAGNOSIS — R11.2 NAUSEA WITH VOMITING, UNSPECIFIED: ICD-10-CM

## 2022-04-10 DIAGNOSIS — R19.7 DIARRHEA, UNSPECIFIED: ICD-10-CM

## 2022-04-10 DIAGNOSIS — K59.00 CONSTIPATION, UNSPECIFIED: ICD-10-CM

## 2022-04-10 DIAGNOSIS — K56.699 OTHER INTESTINAL OBSTRUCTION UNSPECIFIED AS TO PARTIAL VERSU: ICD-10-CM

## 2022-04-10 LAB
ANION GAP SERPL CALCULATED.3IONS-SCNC: 9 MMOL/L (ref 5–15)
BASOPHILS # BLD AUTO: 0.1 10*3/MM3 (ref 0–0.2)
BASOPHILS NFR BLD AUTO: 1.2 % (ref 0–1.5)
BUN SERPL-MCNC: 8 MG/DL (ref 8–23)
BUN/CREAT SERPL: 8.8 (ref 7–25)
CALCIUM SPEC-SCNC: 7.9 MG/DL (ref 8.6–10.5)
CHLORIDE SERPL-SCNC: 105 MMOL/L (ref 98–107)
CO2 SERPL-SCNC: 22 MMOL/L (ref 22–29)
CREAT SERPL-MCNC: 0.91 MG/DL (ref 0.76–1.27)
DEPRECATED RDW RBC AUTO: 40.3 FL (ref 37–54)
EGFRCR SERPLBLD CKD-EPI 2021: 86.3 ML/MIN/1.73
EOSINOPHIL # BLD AUTO: 0.2 10*3/MM3 (ref 0–0.4)
EOSINOPHIL NFR BLD AUTO: 2.4 % (ref 0.3–6.2)
ERYTHROCYTE [DISTWIDTH] IN BLOOD BY AUTOMATED COUNT: 13 % (ref 12.3–15.4)
GLUCOSE SERPL-MCNC: 106 MG/DL (ref 65–99)
HCT VFR BLD AUTO: 37.7 % (ref 37.5–51)
HGB BLD-MCNC: 13.4 G/DL (ref 13–17.7)
LYMPHOCYTES # BLD AUTO: 1.7 10*3/MM3 (ref 0.7–3.1)
LYMPHOCYTES NFR BLD AUTO: 21.1 % (ref 19.6–45.3)
MAGNESIUM SERPL-MCNC: 1.8 MG/DL (ref 1.6–2.4)
MCH RBC QN AUTO: 32.1 PG (ref 26.6–33)
MCHC RBC AUTO-ENTMCNC: 35.5 G/DL (ref 31.5–35.7)
MCV RBC AUTO: 90.4 FL (ref 79–97)
MONOCYTES # BLD AUTO: 1 10*3/MM3 (ref 0.1–0.9)
MONOCYTES NFR BLD AUTO: 12.1 % (ref 5–12)
NEUTROPHILS NFR BLD AUTO: 5 10*3/MM3 (ref 1.7–7)
NEUTROPHILS NFR BLD AUTO: 63.2 % (ref 42.7–76)
NRBC BLD AUTO-RTO: 0.2 /100 WBC (ref 0–0.2)
PLATELET # BLD AUTO: 197 10*3/MM3 (ref 140–450)
PMV BLD AUTO: 8.5 FL (ref 6–12)
POTASSIUM SERPL-SCNC: 4.2 MMOL/L (ref 3.5–5.2)
RBC # BLD AUTO: 4.17 10*6/MM3 (ref 4.14–5.8)
SODIUM SERPL-SCNC: 136 MMOL/L (ref 136–145)
WBC NRBC COR # BLD: 8 10*3/MM3 (ref 3.4–10.8)

## 2022-04-10 PROCEDURE — G0378 HOSPITAL OBSERVATION PER HR: HCPCS

## 2022-04-10 PROCEDURE — 87338 HPYLORI STOOL AG IA: CPT | Performed by: NURSE PRACTITIONER

## 2022-04-10 PROCEDURE — 99214 OFFICE O/P EST MOD 30 MIN: CPT | Performed by: NURSE PRACTITIONER

## 2022-04-10 PROCEDURE — 85025 COMPLETE CBC W/AUTO DIFF WBC: CPT | Performed by: INTERNAL MEDICINE

## 2022-04-10 PROCEDURE — 99204 OFFICE O/P NEW MOD 45 MIN: CPT | Performed by: NURSE PRACTITIONER

## 2022-04-10 PROCEDURE — 80048 BASIC METABOLIC PNL TOTAL CA: CPT | Performed by: INTERNAL MEDICINE

## 2022-04-10 PROCEDURE — 99204 OFFICE O/P NEW MOD 45 MIN: CPT | Performed by: SURGERY

## 2022-04-10 PROCEDURE — 83735 ASSAY OF MAGNESIUM: CPT | Performed by: INTERNAL MEDICINE

## 2022-04-10 PROCEDURE — 99225 PR SBSQ OBSERVATION CARE/DAY 25 MINUTES: CPT | Performed by: INTERNAL MEDICINE

## 2022-04-10 PROCEDURE — 74018 RADEX ABDOMEN 1 VIEW: CPT

## 2022-04-10 RX ORDER — PANTOPRAZOLE SODIUM 40 MG/1
40 TABLET, DELAYED RELEASE ORAL
Status: DISCONTINUED | OUTPATIENT
Start: 2022-04-11 | End: 2022-04-11 | Stop reason: HOSPADM

## 2022-04-10 RX ORDER — MAGNESIUM CARB/ALUMINUM HYDROX 105-160MG
296 TABLET,CHEWABLE ORAL ONCE
Status: COMPLETED | OUTPATIENT
Start: 2022-04-10 | End: 2022-04-10

## 2022-04-10 RX ORDER — POLYETHYLENE GLYCOL 3350 17 G/17G
17 POWDER, FOR SOLUTION ORAL 2 TIMES DAILY
Status: DISCONTINUED | OUTPATIENT
Start: 2022-04-10 | End: 2022-04-11 | Stop reason: HOSPADM

## 2022-04-10 RX ADMIN — Medication 10 ML: at 20:52

## 2022-04-10 RX ADMIN — Medication 296 ML: at 16:32

## 2022-04-10 RX ADMIN — SENNOSIDES AND DOCUSATE SODIUM 2 TABLET: 50; 8.6 TABLET ORAL at 20:51

## 2022-04-10 RX ADMIN — SENNOSIDES AND DOCUSATE SODIUM 2 TABLET: 50; 8.6 TABLET ORAL at 09:39

## 2022-04-10 RX ADMIN — POLYETHYLENE GLYCOL 3350 17 G: 17 POWDER, FOR SOLUTION ORAL at 20:56

## 2022-04-10 RX ADMIN — ACETAMINOPHEN 650 MG: 325 TABLET ORAL at 00:25

## 2022-04-10 RX ADMIN — Medication 10 ML: at 09:39

## 2022-04-10 NOTE — CONSULTS
GENERAL SURGERY CONSULTATION NOTE    Consult requested by: [facility]    Patient Care Team:  Iker Artis MD as PCP - General (Family Medicine)    Reason for consult: Small bowel obstruction    Subjective     Patient is a 77 y.o. male presents as a transfer from [facility] where the patient was evaluated in the ER for abdominal pain, abdominal distention, and vomiting.  The patient reports that 48 hours ago he developed acute onset of radiating lower abdominal pain which was severe.  He reports for the past week or so he has had dull aches in his lower abdomen with gurgling sounds, but has not had any difficulties.  He then subsequently developed nausea and vomiting.  He went to [facility] which is close to his hometown in [place], [place].  There, he had a CT scan of the abdomen and pelvis performed which raised the concern for possible small bowel obstruction.  Unfortunately, [facility] does not have a general surgeon on-call this weekend, the patient needed to be transferred to our institution for general surgical evaluation.  The patient arrived to the hospital, denied any nausea or vomiting, and did not require placement of a nasogastric tube.  A repeat CT scan of the abdomen and pelvis demonstrated stool within the right colon and some dilated loops of small bowel concerning for possible bowel obstruction.  However throughout the day today, the patient reports that his abdominal pain is completely gone.  He is passing flatus, having bowel movements, and not having any nausea or vomiting on a clear liquid diet.  His only prior abdominal surgery is a laparoscopic cholecystectomy.    Review of Systems   Constitutional: Negative for appetite change, chills and fever.   HENT: Negative for congestion and sore throat.    Respiratory: Negative for cough and shortness of breath.    Cardiovascular: Negative for chest pain and palpitations.    Gastrointestinal: Positive for abdominal distention, abdominal pain, nausea and vomiting. Negative for constipation, diarrhea and GERD.   Genitourinary: Negative for difficulty urinating, dysuria and frequency.   Musculoskeletal: Negative for arthralgias and back pain.   Skin: Negative for rash and skin lesions.   Neurological: Negative for dizziness, seizures and memory problem.   Hematological: Negative for adenopathy. Does not bruise/bleed easily.   Psychiatric/Behavioral: Negative for sleep disturbance and depressed mood.        History  Past Medical History:   Diagnosis Date   • Arthritis    • Cataract    • Hypertension    • Shingles    • Sleep apnea      Past Surgical History:   Procedure Laterality Date   • BACK SURGERY  1985   • CATARACT EXTRACTION     • GALLBLADDER SURGERY  2000   • HAND SURGERY Right 1967   • KNEE SURGERY Left 1990/1993     Family History   Problem Relation Age of Onset   • Heart disease Mother    • Hypertension Mother    • Alzheimer's disease Father    • Diabetes Father    • Dementia Father    • Arthritis Brother    • Diabetes Brother    • Dementia Brother      Social History     Tobacco Use   • Smoking status: Former Smoker   • Smokeless tobacco: Never Used   Vaping Use   • Vaping Use: Never used   Substance Use Topics   • Alcohol use: Never     Medications Prior to Admission   Medication Sig Dispense Refill Last Dose   • amitriptyline (ELAVIL) 25 MG tablet Take 1 tablet by mouth Every Night. 90 tablet 1 Past Week at Unknown time   • amLODIPine (NORVASC) 5 MG tablet Take 5 mg by mouth Daily.   4/8/2022 at Unknown time   • aspirin 81 MG EC tablet Take 81 mg by mouth. Takes 3 days a week   4/8/2022 at Unknown time   • B Complex Vitamins (Vitamin B-Complex) tablet Take 1 capsule by mouth Daily.   4/8/2022 at Unknown time   • benazepril (LOTENSIN) 10 MG tablet Take 10 mg by mouth Daily.   4/8/2022 at Unknown time   • Calcium Carbonate-Vitamin D (Calcium-Vitamin D) 600-125 MG-UNIT tablet  Take 1 tablet by mouth Daily.   4/8/2022 at Unknown time   • cetirizine (zyrTEC) 10 MG tablet Take 10 mg by mouth Daily.   4/8/2022 at Unknown time   • Cholecalciferol 25 MCG (1000 UT) capsule Take  by mouth.   4/8/2022 at Unknown time   • FEXOFENADINE HCL PO Take  by mouth Daily. Unknown dosage   4/8/2022 at Unknown time   • Magnesium 250 MG tablet Take 1 tablet by mouth Daily.   4/8/2022 at Unknown time   • montelukast (SINGULAIR) 10 MG tablet Take 10 mg by mouth Daily.   4/8/2022 at Unknown time   • omeprazole OTC (PriLOSEC OTC) 20 MG EC tablet Take 20 mg by mouth Daily.   4/8/2022 at Unknown time   • POTASSIUM GLUCONATE PO Take  by mouth Daily.   4/8/2022 at Unknown time   • rosuvastatin (CRESTOR) 10 MG tablet Take 10 mg by mouth Daily.   4/8/2022 at Unknown time     Allergies:  Acetaminophen, Ibuprofen, Other, Oxycodone-aspirin, Penicillins, Sulfa antibiotics, and Vioxx [rofecoxib]    Objective     Vital Signs  Temp:  [97.3 °F (36.3 °C)-98.5 °F (36.9 °C)] 97.3 °F (36.3 °C)  Heart Rate:  [61-81] 81  Resp:  [18-20] 18  BP: (100-148)/(60-85) 148/81    Physical Exam  Vitals reviewed.   Constitutional:       Appearance: He is well-developed.   HENT:      Head: Normocephalic and atraumatic.   Eyes:      Pupils: Pupils are equal, round, and reactive to light.   Cardiovascular:      Rate and Rhythm: Normal rate and regular rhythm.   Pulmonary:      Effort: Pulmonary effort is normal.      Breath sounds: Normal breath sounds.   Abdominal:      General: Abdomen is flat. There is no distension.      Palpations: Abdomen is soft.      Tenderness: There is no abdominal tenderness.      Hernia: No hernia is present.   Musculoskeletal:         General: Normal range of motion.      Cervical back: Normal range of motion.   Lymphadenopathy:      Cervical: No cervical adenopathy.   Skin:     General: Skin is warm and dry.      Findings: No rash.   Neurological:      General: No focal deficit present.      Mental Status: He is  alert and oriented to person, place, and time.   Psychiatric:         Mood and Affect: Mood normal.         Behavior: Behavior normal.         Thought Content: Thought content normal.         Judgment: Judgment normal.         Results Review:   Lab Results (last 24 hours)     Procedure Component Value Units Date/Time    CBC & Differential [[id number]]  (Abnormal) Collected: 04/10/22 0338    Specimen: Blood Updated: 04/10/22 0445    Narrative:      The following orders were created for panel order CBC & Differential.  Procedure                               Abnormality         Status                     ---------                               -----------         ------                     CBC Auto Differential[[id number]]        Abnormal            Final result                 Please view results for these tests on the individual orders.    CBC Auto Differential [[id number]]  (Abnormal) Collected: 04/10/22 0338    Specimen: Blood Updated: 04/10/22 0445     WBC 8.00 10*3/mm3      RBC 4.17 10*6/mm3      Hemoglobin 13.4 g/dL      Comment: Result checked         Hematocrit 37.7 %      MCV 90.4 fL      MCH 32.1 pg      MCHC 35.5 g/dL      RDW 13.0 %      RDW-SD 40.3 fl      MPV 8.5 fL      Platelets 197 10*3/mm3      Neutrophil % 63.2 %      Lymphocyte % 21.1 %      Monocyte % 12.1 %      Eosinophil % 2.4 %      Basophil % 1.2 %      Neutrophils, Absolute 5.00 10*3/mm3      Lymphocytes, Absolute 1.70 10*3/mm3      Monocytes, Absolute 1.00 10*3/mm3      Eosinophils, Absolute 0.20 10*3/mm3      Basophils, Absolute 0.10 10*3/mm3      nRBC 0.2 /100 WBC     Basic Metabolic Panel [[id number]]  (Abnormal) Collected: 04/10/22 0338    Specimen: Blood Updated: 04/10/22 0441     Glucose 106 mg/dL      BUN 8 mg/dL      Creatinine 0.91 mg/dL      Sodium 136 mmol/L      Potassium 4.2 mmol/L      Chloride 105 mmol/L      CO2 22.0 mmol/L      Calcium 7.9 mg/dL      BUN/Creatinine Ratio 8.8     Anion Gap 9.0 mmol/L      eGFR 86.3 mL/min/1.73       Comment: National Kidney Foundation and American Society of Nephrology (ASN) Task Force recommended calculation based on the Chronic Kidney Disease Epidemiology Collaboration (CKD-EPI) equation refit without adjustment for race.       Narrative:      GFR Normal >60  Chronic Kidney Disease <60  Kidney Failure <15      Magnesium [[id number]]  (Normal) Collected: 04/10/22 0338    Specimen: Blood Updated: 04/10/22 0441     Magnesium 1.8 mg/dL     H. Pylori Antigen, Stool - Stool, Per Rectum [[id number]] Collected: 04/10/22 0413    Specimen: Stool from Per Rectum Updated: 04/10/22 0416    Comprehensive Metabolic Panel [[id number]] Collected: 04/09/22 1649    Specimen: Blood Updated: 04/09/22 1801     Glucose 94 mg/dL      BUN 9 mg/dL      Creatinine 1.08 mg/dL      Sodium 137 mmol/L      Potassium 4.4 mmol/L      Comment: Slight hemolysis detected by analyzer. Results may be affected.        Chloride 101 mmol/L      CO2 22.0 mmol/L      Calcium 9.4 mg/dL      Total Protein 7.1 g/dL      Albumin 4.30 g/dL      ALT (SGPT) 15 U/L      AST (SGOT) 24 U/L      Comment: Slight hemolysis detected by analyzer. Results may be affected.        Alkaline Phosphatase 86 U/L      Total Bilirubin 0.6 mg/dL      Globulin 2.8 gm/dL      A/G Ratio 1.5 g/dL      BUN/Creatinine Ratio 8.3     Anion Gap 14.0 mmol/L      eGFR 70.2 mL/min/1.73      Comment: National Kidney Foundation and American Society of Nephrology (ASN) Task Force recommended calculation based on the Chronic Kidney Disease Epidemiology Collaboration (CKD-EPI) equation refit without adjustment for race.       Narrative:      GFR Normal >60  Chronic Kidney Disease <60  Kidney Failure <15      Magnesium [[id number]]  (Normal) Collected: 04/09/22 1649    Specimen: Blood Updated: 04/09/22 1801     Magnesium 2.0 mg/dL     CBC & Differential [[id number]]  (Abnormal) Collected: 04/09/22 1649    Specimen: Blood Updated: 04/09/22 1700    Narrative:      The following orders were  created for panel order CBC & Differential.  Procedure                               Abnormality         Status                     ---------                               -----------         ------                     CBC Auto Differential[[id number]]        Abnormal            Final result                 Please view results for these tests on the individual orders.    CBC Auto Differential [[id number]]  (Abnormal) Collected: 04/09/22 1649    Specimen: Blood Updated: 04/09/22 1700     WBC 10.20 10*3/mm3      RBC 4.84 10*6/mm3      Hemoglobin 15.7 g/dL      Hematocrit 43.7 %      MCV 90.3 fL      MCH 32.5 pg      MCHC 36.0 g/dL      RDW 13.2 %      RDW-SD 41.1 fl      MPV 8.3 fL      Platelets 208 10*3/mm3      Neutrophil % 71.9 %      Lymphocyte % 17.4 %      Monocyte % 9.2 %      Eosinophil % 0.7 %      Basophil % 0.8 %      Neutrophils, Absolute 7.30 10*3/mm3      Lymphocytes, Absolute 1.80 10*3/mm3      Monocytes, Absolute 0.90 10*3/mm3      Eosinophils, Absolute 0.10 10*3/mm3      Basophils, Absolute 0.10 10*3/mm3      nRBC 0.1 /100 WBC     POC Glucose Once [[id number]]  (Normal) Collected: 04/09/22 1536    Specimen: Blood Updated: 04/09/22 1537     Glucose 99 mg/dL      Comment: Serial Number: [id number]:  [id number]           XR Abdomen KUB    Result Date: 4/9/2022  Several dilated small bowel loops, compatible with clinical history of small bowel obstruction.  Electronically Signed By-Oli Cook MD On:4/9/2022 5:42 PM This report was finalized on [id number] by  Oli Cook MD.        I reviewed the patient's new imaging results and agree with the interpretation.  I reviewed the patient's other test results and agree with the interpretation    Assessment/Plan     Active Problems:    SBO (small bowel obstruction) (HCC)    Fecal impaction (HCC)    Primary hypertension    Mixed hyperlipidemia    Primary osteoarthritis involving multiple joints    Obstructive sleep apnea    The patient has a  completely benign abdominal exam and no clinical evidence of bowel obstruction as he is passing flatus and having bowel movements.  My recommendation will be to advance the patient's diet as tolerated, and if he is able to tolerate this without nausea, vomiting, increasing abdominal pain, or abdominal distention, then he can be discharged home from a general surgical perspective.  Should he develop any of the above-mentioned symptoms, I would recommend placement of a nasogastric tube for decompression, and we will reevaluate the patient again.  I feel that it is unlikely that the patient has a bowel obstruction as his only intra-abdominal surgery is laparoscopic cholecystectomy, however if he continues to have symptoms of obstruction, may plan for diagnostic laparoscopy to rule out obstruction  Okay for MiraLAX for bowel movements    I discussed the patients findings and my recommendations with the patient.     Jamie Espinoza MD  04/10/22  12:23 EDT

## 2022-04-10 NOTE — PLAN OF CARE
Goal Outcome Evaluation:  Patient reporting several loose bowel movements today, no nausea or vomiting, GI placed orders for stool sample, hat placed in toilet to obtain, no results yet at this time, pt afebrile

## 2022-04-10 NOTE — CONSULTS
"GI CONSULT  NOTE:    Referring Provider:   Dr Wei    Chief complaint:  Abdominal pain, diarrhea, CT showed small bowel obstruction    Subjective    \"pain is gone\"    History of present illness:    Patient is a 77-year-old male with a history of GERD, H. pylori gastritis 2020, osteoarthritis, hypertension, shingles, sleep apnea who was transferred from [facility] for small bowel obstruction.  Patient started having nausea vomiting abdominal pain on Friday, 4/7/2022.  Patient has been having liquid stools.  KUB done 4/9/2022 here shows a moderate amount of stool in the colon but several dilated small bowel loops.  Labs reveal a normal CMP, magnesium normal at 2.  WBCs 10, hemoglobin 15.7, platelets are 208.  General surgery is following.    Patient was treated for H. pylori gastritis in March 2020 with Pylera.  He came back positive again on 6/10/2020 and treated with Levaquin, Alinia, doxycycline, and PPI twice a day.  Patient was to have repeat testing for eradication but he never followed up.   Patient states he has been having intermittent problems with his abdomen where he eats throughout the day feels very bloated and discomfort at nighttime but by the morning he feels better.  Patient states just prior to getting ill he had fish and broccoli.  His only abdominal surgery is cholecystectomy.  Patient states he had bilious emesis no hematemesis or coffee-ground emesis.  Patient symptoms symptoms started on Friday, April 7, 2022.  Patient states she generally suffers from some constipation with small tony as well as liquid stool.  Patient denies any melena hematochezia or bright red blood per rectum.  Patient has a history of GERD that is controlled by omeprazole every day.      Endo History:  1/22/2014 EGD/colonoscopy (Dr. Best) H pylori positive.  Diverticulosis of the sigmoid colon.  Recall 2024.    Past Medical History:  Past Medical History:   Diagnosis Date   • Arthritis    • Cataract  "   • Hypertension    • Shingles    • Sleep apnea        Past Surgical History:  Past Surgical History:   Procedure Laterality Date   • BACK SURGERY  1985   • CATARACT EXTRACTION     • GALLBLADDER SURGERY  2000   • HAND SURGERY Right 1967   • KNEE SURGERY Left 1990/1993       Social History:  Social History     Tobacco Use   • Smoking status: Former Smoker   • Smokeless tobacco: Never Used   Vaping Use   • Vaping Use: Never used   Substance Use Topics   • Alcohol use: Never       Family History:  Family History   Problem Relation Age of Onset   • Heart disease Mother    • Hypertension Mother    • Alzheimer's disease Father    • Diabetes Father    • Dementia Father    • Arthritis Brother    • Diabetes Brother    • Dementia Brother        Medications:  Medications Prior to Admission   Medication Sig Dispense Refill Last Dose   • amitriptyline (ELAVIL) 25 MG tablet Take 1 tablet by mouth Every Night. 90 tablet 1 Past Week at Unknown time   • amLODIPine (NORVASC) 5 MG tablet Take 5 mg by mouth Daily.   4/8/2022 at Unknown time   • aspirin 81 MG EC tablet Take 81 mg by mouth. Takes 3 days a week   4/8/2022 at Unknown time   • B Complex Vitamins (Vitamin B-Complex) tablet Take 1 capsule by mouth Daily.   4/8/2022 at Unknown time   • benazepril (LOTENSIN) 10 MG tablet Take 10 mg by mouth Daily.   4/8/2022 at Unknown time   • Calcium Carbonate-Vitamin D (Calcium-Vitamin D) 600-125 MG-UNIT tablet Take 1 tablet by mouth Daily.   4/8/2022 at Unknown time   • cetirizine (zyrTEC) 10 MG tablet Take 10 mg by mouth Daily.   4/8/2022 at Unknown time   • Cholecalciferol 25 MCG (1000 UT) capsule Take  by mouth.   4/8/2022 at Unknown time   • FEXOFENADINE HCL PO Take  by mouth Daily. Unknown dosage   4/8/2022 at Unknown time   • Magnesium 250 MG tablet Take 1 tablet by mouth Daily.   4/8/2022 at Unknown time   • montelukast (SINGULAIR) 10 MG tablet Take 10 mg by mouth Daily.   4/8/2022 at Unknown time   • omeprazole OTC (PriLOSEC OTC) 20  MG EC tablet Take 20 mg by mouth Daily.   4/8/2022 at Unknown time   • POTASSIUM GLUCONATE PO Take  by mouth Daily.   4/8/2022 at Unknown time   • rosuvastatin (CRESTOR) 10 MG tablet Take 10 mg by mouth Daily.   4/8/2022 at Unknown time       Scheduled Meds:senna-docusate sodium, 2 tablet, Oral, BID  sodium chloride, 10 mL, Intravenous, Q12H      Continuous Infusions:sodium chloride, 100 mL/hr, Last Rate: 100 mL/hr (04/09/22 2314)      PRN Meds:.•  acetaminophen **OR** acetaminophen **OR** acetaminophen  •  aluminum-magnesium hydroxide-simethicone  •  senna-docusate sodium **AND** polyethylene glycol **AND** bisacodyl **AND** bisacodyl  •  ondansetron **OR** ondansetron  •  oxyCODONE  •  oxyCODONE  •  sodium chloride    ALLERGIES:  Acetaminophen, Ibuprofen, Other, Oxycodone-aspirin, Penicillins, Sulfa antibiotics, and Vioxx [rofecoxib]    ROS:  Review of Systems   Gastrointestinal: Positive for abdominal distention, abdominal pain, constipation, diarrhea, nausea and vomiting. Negative for anal bleeding, blood in stool and rectal pain.       The following systems were reviewed and negative;   Constitution:  No fevers, chills, no unintentional weight loss  Skin: no rash, no jaundice  Eyes:  No blurry vision, no eye pain  HENT:  No change in hearing or smell  Resp:  No dyspnea or cough  CV:  No chest pain or palpitations  :  No dysuria, hematuria  Musculoskeletal:  No leg cramps or arthralgias  Neuro:  No tremor, no numbness  Psych:  No depression or confusion    Objective  Resting comfortably in hospital bed.  NAD.  Room 4122    Vital Signs:   Vitals:    04/09/22 1500 04/09/22 1537 04/09/22 1618 04/09/22 1959   BP: 144/85 144/85  100/61   BP Location: Left arm Right arm  Right arm   Patient Position: Lying Lying  Lying   Pulse: 67 67  75   Resp: 18 18  20   Temp: 97.7 °F (36.5 °C) 97.7 °F (36.5 °C)  98.1 °F (36.7 °C)   TempSrc: Oral Oral  Oral   SpO2:  98%  95%   Weight: 82.5 kg (181 lb 14.1 oz) 82.5 kg (181 lb 14.1  "oz) 82.5 kg (181 lb 14.1 oz)    Height: 172.7 cm (68\") 172.7 cm (68\")         Physical Exam:   General Appearance:    Awake and alert, in no acute distress   Head:    Normocephalic, without obvious abnormality, atraumatic   Eyes:            Conjunctivae normal, anicteric sclerae, pupils equal   Ears:    Ears appear intact with no abnormalities noted   Throat:   No oral lesions, no thrush, oral mucosa moist   Neck:   Supple, no JVD   Lungs:     respirations regular, even and unlabored       Chest Wall:    No abnormalities observed   Abdomen:     Normal bowel sounds, soft, nontender, no rebound or guarding, nondistended, no hepatosplenomegaly   Rectal:     Deferred   Extremities:   Moves all extremities, no edema, no cyanosis   Pulses:   Pulses palpable and equal bilaterally   Skin:   No rash, no jaundice, normal palpation   Lymph nodes:   No cervical, supraclavicular or submandibular palpable adenopathy   Neurologic:   Cranial nerves 2 - 12 grossly intact, no asterixis       Results Review:   I reviewed the patient's labs and imaging.  Lab Results (last 24 hours)     Procedure Component Value Units Date/Time    Comprehensive Metabolic Panel [[id number]] Collected: 04/09/22 1649    Specimen: Blood Updated: 04/09/22 1801     Glucose 94 mg/dL      BUN 9 mg/dL      Creatinine 1.08 mg/dL      Sodium 137 mmol/L      Potassium 4.4 mmol/L      Comment: Slight hemolysis detected by analyzer. Results may be affected.        Chloride 101 mmol/L      CO2 22.0 mmol/L      Calcium 9.4 mg/dL      Total Protein 7.1 g/dL      Albumin 4.30 g/dL      ALT (SGPT) 15 U/L      AST (SGOT) 24 U/L      Comment: Slight hemolysis detected by analyzer. Results may be affected.        Alkaline Phosphatase 86 U/L      Total Bilirubin 0.6 mg/dL      Globulin 2.8 gm/dL      A/G Ratio 1.5 g/dL      BUN/Creatinine Ratio 8.3     Anion Gap 14.0 mmol/L      eGFR 70.2 mL/min/1.73      Comment: National Kidney Foundation and American Society of Nephrology " (ASN) Task Force recommended calculation based on the Chronic Kidney Disease Epidemiology Collaboration (CKD-EPI) equation refit without adjustment for race.       Narrative:      GFR Normal >60  Chronic Kidney Disease <60  Kidney Failure <15      Magnesium [[id number]]  (Normal) Collected: 04/09/22 1649    Specimen: Blood Updated: 04/09/22 1801     Magnesium 2.0 mg/dL     CBC & Differential [[id number]]  (Abnormal) Collected: 04/09/22 1649    Specimen: Blood Updated: 04/09/22 1700    Narrative:      The following orders were created for panel order CBC & Differential.  Procedure                               Abnormality         Status                     ---------                               -----------         ------                     CBC Auto Differential[[id number]]        Abnormal            Final result                 Please view results for these tests on the individual orders.    CBC Auto Differential [[id number]]  (Abnormal) Collected: 04/09/22 1649    Specimen: Blood Updated: 04/09/22 1700     WBC 10.20 10*3/mm3      RBC 4.84 10*6/mm3      Hemoglobin 15.7 g/dL      Hematocrit 43.7 %      MCV 90.3 fL      MCH 32.5 pg      MCHC 36.0 g/dL      RDW 13.2 %      RDW-SD 41.1 fl      MPV 8.3 fL      Platelets 208 10*3/mm3      Neutrophil % 71.9 %      Lymphocyte % 17.4 %      Monocyte % 9.2 %      Eosinophil % 0.7 %      Basophil % 0.8 %      Neutrophils, Absolute 7.30 10*3/mm3      Lymphocytes, Absolute 1.80 10*3/mm3      Monocytes, Absolute 0.90 10*3/mm3      Eosinophils, Absolute 0.10 10*3/mm3      Basophils, Absolute 0.10 10*3/mm3      nRBC 0.1 /100 WBC     POC Glucose Once [[id number]]  (Normal) Collected: 04/09/22 1536    Specimen: Blood Updated: 04/09/22 1537     Glucose 99 mg/dL      Comment: Serial Number: [id number]:  [id number]             Imaging Results (Last 24 Hours)     Procedure Component Value Units Date/Time    CT Outside Films [[id number]] Resulted: 04/09/22 1806     Updated: 04/09/22 1806     Narrative:      This procedure was auto-finalized with no dictation required.    XR Abdomen KUB [[id number]] Collected: 04/09/22 1741     Updated: 04/09/22 1744    Narrative:      XR ABDOMEN KUB-     Date of Exam: 4/9/2022 5:04 PM     Indication: small bowel obstruction.     Comparison Exams: None available.     Technique: Single AP radiograph of the abdomen     FINDINGS:  There are several dilated small bowel loops, compatible with history of  small bowel obstruction. Moderate stool is present within the colon. No  pathological calcifications are identified. Contrast from a recent  procedure is present in the urinary bladder. The osseous structures  appear intact. Right upper quadrant cholecystectomy clips are noted.       Impression:      Several dilated small bowel loops, compatible with clinical history of  small bowel obstruction.     Electronically Signed By-Oli Cook MD On:4/9/2022 5:42 PM  This report was finalized on [id number] by  Oli Cook MD.             ASSESSMENT AND PLAN:  Abnormal CT showing small bowel obstruction  Abdominal pain, nausea vomiting all resolved-suspect small bowel obstruction has resolved  History of cholecystectomy  Chronic constipation  GERD stable on PPI  Hypertension  Sleep apnea  H. pylori gastritis never returned for eradication test    Plan:  Check stool for H. pylori eradication.  We will treat accordingly  KUB shows moderate amount of stool and probable small bowel obstruction.  Patient is now pain-free and passing stool.  Surgery has seen and cleared patient and is ordered regular diet.  I would recommend bowel purge and starting patient on a daily bowel regimen.    If patient has recurrent pain, nausea, vomiting after eating then I would recommend small bowel follow-through to further evaluate.  Continue PPI for history of GERD    I discussed the patient's findings and my recommendations with the patient.  Sheron Gilmore, APRN  04/10/22  00:20  EDT    Time:

## 2022-04-10 NOTE — PLAN OF CARE
Goal Outcome Evaluation:              Outcome Evaluation: Pt denies c/o pain today. VSS. Evaluated per Hospitalist, GI, Surgery. Diet advanced. Awaiting repeat KUB. Plan ongoing.

## 2022-04-10 NOTE — PROGRESS NOTES
[facility] Medicine Services Daily Progress Note    Patient Name: Lacho Ayala  : 1945  MRN: [id number]  Primary Care Physician:  Iker Artis MD  Date of admission: 2022      Subjective      Chief Complaint: Nausea, vomiting and abdominal pain.      Patient Reports no overnight events.        Review of Systems   Constitutional: Negative.   HENT: Negative.    Eyes: Negative.    Cardiovascular: Negative.    Respiratory: Negative.    Endocrine: Negative.    Hematologic/Lymphatic: Negative.    Skin: Negative.    Musculoskeletal: Negative.    Gastrointestinal: Negative.    Genitourinary: Negative.    Neurological: Negative.    Psychiatric/Behavioral: Negative.    Allergic/Immunologic: Negative.             Objective      Vitals:   Temp:  [97.3 °F (36.3 °C)-98.5 °F (36.9 °C)] 97.9 °F (36.6 °C)  Heart Rate:  [61-81] 69  Resp:  [18-20] 18  BP: (100-148)/(60-81) 103/64    Physical Exam  Vitals and nursing note reviewed.   Constitutional:       General: He is not in acute distress.  HENT:      Head: Normocephalic.      Nose: Nose normal.      Mouth/Throat:      Mouth: Mucous membranes are dry.      Pharynx: Oropharynx is clear.   Eyes:      Extraocular Movements: Extraocular movements intact.      Conjunctiva/sclera: Conjunctivae normal.      Pupils: Pupils are equal, round, and reactive to light.   Cardiovascular:      Pulses: Normal pulses.      Heart sounds: No murmur heard.    No friction rub. No gallop.      Comments: S1 and S2 present.  No tachycardia.  Pulmonary:      Effort: Pulmonary effort is normal.      Breath sounds: No stridor. No wheezing or rales.   Chest:      Chest wall: No tenderness.   Abdominal:      General: Bowel sounds are normal. There is no distension.      Palpations: Abdomen is soft.      Tenderness: There is no abdominal tenderness. There is no right CVA tenderness or guarding.   Musculoskeletal:         General: No swelling, tenderness, deformity  or signs of injury.      Cervical back: Normal range of motion. No rigidity.      Right lower leg: No edema.      Left lower leg: No edema.   Skin:     General: Skin is warm and dry.      Capillary Refill: Capillary refill takes less than 2 seconds.      Coloration: Skin is not jaundiced.      Findings: No bruising, erythema, lesion or rash.   Neurological:      Comments: No facial asymmetry noted.  Gait and station not tested.   Psychiatric:      Comments: No agitation.               Result Review    Result Review:  I have personally reviewed the results from the time of this admission to 4/10/2022 17:56 EDT and agree with these findings:  [x]  Laboratory  [x]  Microbiology  [x]  Radiology  []  EKG/Telemetry   []  Cardiology/Vascular   []  Pathology  []  Old records  []  Other:  Most notable findings include:        Procedure Component Value Units Date/Time   XR Abdomen KUB [[id number]] Otoniel as Reviewed   Order Status: Completed Collected: 04/10/22 1435    Updated: 04/10/22 1440   Narrative:     XR ABDOMEN KUB-       Date of Exam: 4/10/2022 2:12 PM       Indication: Abdominal Pain.       Comparison Exams: April 19, 2022       Technique: Single AP radiograph of the abdomen       FINDINGS:   Several mildly dilated small bowel loops appear slightly improved. Mild   stool is present within the colon. No pathological calcifications are   identified. The osseous structures appear intact. Right upper quadrant   cholecystectomy clips are noted.       Impression:     Several mildly dilated small bowel loops appears slightly improved,   suggesting improving small bowel obstruction.       Electronically Signed By-Oli Cook MD On:4/10/2022 2:37 PM   This report was finalized on [id number] by  Oli Cook MD.           Assessment/Plan    From previous note and with minor updates.  Brief Patient Summary:  Patient is a 77-year-old male with past medical history of GERD, DJD, osteoarthritis of multiple joints,  hypertension, hyperlipidemia, shingles, obstructive sleep apnea and cataracts status post cataract extraction who transferred from [facility] emergency room because of diagnosis of a small bowel obstruction.  Patient reported onset of his symptoms on Friday with a nausea vomiting and abdominal pain.  Patient now complains of a liquid stools and decreasing the abdominal pain.  Patient was transferred to [facility] because there was no surgeon at [facility].  The emergency room physician at the [facility] discussed this case with Dr. Espinoza.  Patient now complains of having liquid stools and the KUB showed a large stool burden with the distended bowel loops clinically consistent with a SBO.      [START ON 4/11/2022] pantoprazole, 40 mg, Oral, Q AM  polyethylene glycol, 17 g, Oral, BID  senna-docusate sodium, 2 tablet, Oral, BID  sodium chloride, 10 mL, Intravenous, Q12H       sodium chloride, 100 mL/hr, Last Rate: 100 mL/hr (04/10/22 1633)         Active Hospital Problems:  Active Hospital Problems    Diagnosis    • SBO (small bowel obstruction) (HCC)  Follow general surgery recommendations.  Follow GI recommendations.      • Fecal impaction (HCC)  Treat with lactulose as needed.  Follow GI recommendations.      • Primary hypertension  Stable.      • Mixed hyperlipidemia    • Primary osteoarthritis involving multiple joints  Treat with Roxicodone as needed.      • Obstructive sleep apnea  Treat with CPAP.        Continue appropriate patient's home medications for other chronic medical conditions.  Continue the present level of care.  Patient and family agreed with the plan of care.      DVT prophylaxis:  Mechanical DVT prophylaxis orders are present.    CODE STATUS:    Level Of Support Discussed With: Patient  Code Status (Patient has no pulse and is not breathing): CPR (Attempt to Resuscitate)  Medical Interventions (Patient has pulse or is breathing): Full Support      Disposition:  Patient can discharge tomorrow if he continues to tolerate his meals.      This patient has been examined wearing appropriate Personal Protective Equipment and discussed with hospital infection control department, [facility], infectious disease specialist and pulmonologist. 04/10/22      Electronically signed by Solis Wei MD, FACP,  04/10/22, 17:56 EDT.      [facility] Hospitalist Team

## 2022-04-11 VITALS
DIASTOLIC BLOOD PRESSURE: 68 MMHG | HEIGHT: 68 IN | RESPIRATION RATE: 16 BRPM | HEART RATE: 55 BPM | WEIGHT: 181.88 LBS | BODY MASS INDEX: 27.57 KG/M2 | SYSTOLIC BLOOD PRESSURE: 108 MMHG | TEMPERATURE: 97.7 F | OXYGEN SATURATION: 97 %

## 2022-04-11 LAB
ANION GAP SERPL CALCULATED.3IONS-SCNC: 10 MMOL/L (ref 5–15)
BASOPHILS # BLD AUTO: 0.1 10*3/MM3 (ref 0–0.2)
BASOPHILS NFR BLD AUTO: 1.3 % (ref 0–1.5)
BUN SERPL-MCNC: 9 MG/DL (ref 8–23)
BUN/CREAT SERPL: 10.2 (ref 7–25)
CALCIUM SPEC-SCNC: 8.3 MG/DL (ref 8.6–10.5)
CHLORIDE SERPL-SCNC: 103 MMOL/L (ref 98–107)
CO2 SERPL-SCNC: 23 MMOL/L (ref 22–29)
CREAT SERPL-MCNC: 0.88 MG/DL (ref 0.76–1.27)
DEPRECATED RDW RBC AUTO: 42.4 FL (ref 37–54)
EGFRCR SERPLBLD CKD-EPI 2021: 88 ML/MIN/1.73
EOSINOPHIL # BLD AUTO: 0.3 10*3/MM3 (ref 0–0.4)
EOSINOPHIL NFR BLD AUTO: 3.8 % (ref 0.3–6.2)
ERYTHROCYTE [DISTWIDTH] IN BLOOD BY AUTOMATED COUNT: 13.3 % (ref 12.3–15.4)
GLUCOSE SERPL-MCNC: 90 MG/DL (ref 65–99)
HCT VFR BLD AUTO: 36.3 % (ref 37.5–51)
HGB BLD-MCNC: 12.6 G/DL (ref 13–17.7)
LYMPHOCYTES # BLD AUTO: 1.6 10*3/MM3 (ref 0.7–3.1)
LYMPHOCYTES NFR BLD AUTO: 23.6 % (ref 19.6–45.3)
MAGNESIUM SERPL-MCNC: 1.9 MG/DL (ref 1.6–2.4)
MCH RBC QN AUTO: 31.8 PG (ref 26.6–33)
MCHC RBC AUTO-ENTMCNC: 34.8 G/DL (ref 31.5–35.7)
MCV RBC AUTO: 91.5 FL (ref 79–97)
MONOCYTES # BLD AUTO: 0.8 10*3/MM3 (ref 0.1–0.9)
MONOCYTES NFR BLD AUTO: 11.4 % (ref 5–12)
NEUTROPHILS NFR BLD AUTO: 4 10*3/MM3 (ref 1.7–7)
NEUTROPHILS NFR BLD AUTO: 59.9 % (ref 42.7–76)
NRBC BLD AUTO-RTO: 0.1 /100 WBC (ref 0–0.2)
PLATELET # BLD AUTO: 186 10*3/MM3 (ref 140–450)
PMV BLD AUTO: 8.4 FL (ref 6–12)
POTASSIUM SERPL-SCNC: 4 MMOL/L (ref 3.5–5.2)
RBC # BLD AUTO: 3.97 10*6/MM3 (ref 4.14–5.8)
SODIUM SERPL-SCNC: 136 MMOL/L (ref 136–145)
WBC NRBC COR # BLD: 6.7 10*3/MM3 (ref 3.4–10.8)

## 2022-04-11 PROCEDURE — 85025 COMPLETE CBC W/AUTO DIFF WBC: CPT | Performed by: INTERNAL MEDICINE

## 2022-04-11 PROCEDURE — 99217 PR OBSERVATION CARE DISCHARGE MANAGEMENT: CPT | Performed by: STUDENT IN AN ORGANIZED HEALTH CARE EDUCATION/TRAINING PROGRAM

## 2022-04-11 PROCEDURE — 80048 BASIC METABOLIC PNL TOTAL CA: CPT | Performed by: INTERNAL MEDICINE

## 2022-04-11 PROCEDURE — 83735 ASSAY OF MAGNESIUM: CPT | Performed by: INTERNAL MEDICINE

## 2022-04-11 PROCEDURE — G0378 HOSPITAL OBSERVATION PER HR: HCPCS

## 2022-04-11 RX ORDER — PANTOPRAZOLE SODIUM 40 MG/1
40 TABLET, DELAYED RELEASE ORAL
Qty: 30 TABLET | Refills: 0 | Status: SHIPPED | OUTPATIENT
Start: 2022-04-12

## 2022-04-11 RX ORDER — HYDROCORTISONE ACETATE 25 MG/1
25 SUPPOSITORY RECTAL 2 TIMES DAILY
Qty: 10 SUPPOSITORY | Refills: 0 | Status: SHIPPED | OUTPATIENT
Start: 2022-04-11 | End: 2022-04-16

## 2022-04-11 RX ORDER — HYDROCORTISONE ACETATE 25 MG/1
25 SUPPOSITORY RECTAL 2 TIMES DAILY
Status: DISCONTINUED | OUTPATIENT
Start: 2022-04-11 | End: 2022-04-11 | Stop reason: HOSPADM

## 2022-04-11 RX ADMIN — HYDROCORTISONE ACETATE 25 MG: 25 SUPPOSITORY RECTAL at 10:04

## 2022-04-11 RX ADMIN — PANTOPRAZOLE SODIUM 40 MG: 40 TABLET, DELAYED RELEASE ORAL at 05:26

## 2022-04-11 RX ADMIN — Medication 10 ML: at 09:50

## 2022-04-11 NOTE — PLAN OF CARE
Goal Outcome Evaluation:  Patient feeling better, able to tolerate diet without nausea/vomiting, stool having stools, however diarrhea has slowed down and having more solid stool

## 2022-04-11 NOTE — DISCHARGE SUMMARY
[facility]  DISCHARGE SUMMARY    Patient Name: Lacho Ayala  : 1945  MRN: [id number]    Date of Admission: 2022  Discharge Diagnosis: Partial small bowel obstruction  Date of Discharge:  2022  Primary Care Physician: Iker Artis MD      Presenting Problem:   SBO (small bowel obstruction) ([facility]) [K56.609]    Active and Resolved Hospital Problems:  Active Hospital Problems    Diagnosis POA   • SBO (small bowel obstruction) (HCC) [K56.609] Yes   • Fecal impaction (HCC) [K56.41] Yes   • Primary hypertension [I10] Yes   • Mixed hyperlipidemia [E78.2] Yes   • Primary osteoarthritis involving multiple joints [M15.9] Yes   • Obstructive sleep apnea [G47.33] Yes      Resolved Hospital Problems   No resolved problems to display.         Hospital Course     Hospital Course:  Lacho Ayala is a 77 y.o. male with PMHx of GERD and HILARY who presented to [facility] as transfer for possible SBO.  Patient had n/v and abdominal pain the past few days complicated by CT a/p showing possible SBP with some small dilated bowel loops.  GI and surgery consulted.  However, the next day patient was passing gas, laxatives started for bowel movements.  Surgery advanced diet, which patient tolerated, and cleared as long as improved.  GI also cleared on 2022.  Due to hemorrhoidal pain from BM's, steroid suppositories started.  Patient discharged on 2022 in agreement with plan below.        DISCHARGE Follow Up Recommendations for labs and diagnostics:     - Follow up with PCP in 5-7 da days   - Follow up with Dr. Mullen within two weeks   - Low residue diet   - Take Anusol-HC 25mg suppository twice a day for 5 days for hemorrhoid relief   - Take Protonix 40mg PO daily   - Hold Omeprazole      Day of Discharge     Vital Signs:  Temp:  [97.7 °F (36.5 °C)-97.9 °F (36.6 °C)] 97.7 °F (36.5 °C)  Heart Rate:  [52-69] 52  Resp:  [16-18] 16  BP: (103-122)/(64-79)  117/71    Physical Exam:  Physical Exam  Constitutional:       General: He is not in acute distress.     Appearance: He is not toxic-appearing.   HENT:      Head: Normocephalic and atraumatic.      Nose: Nose normal. No congestion.      Mouth/Throat:      Pharynx: Oropharynx is clear. No oropharyngeal exudate.   Eyes:      General: No scleral icterus.  Cardiovascular:      Rate and Rhythm: Normal rate and regular rhythm.      Heart sounds: No murmur heard.    No friction rub. No gallop.   Pulmonary:      Effort: No respiratory distress.      Breath sounds: No wheezing or rales.   Abdominal:      General: There is no distension.      Tenderness: There is no abdominal tenderness. There is no guarding.   Musculoskeletal:         General: No swelling or deformity.      Cervical back: Normal range of motion. No rigidity.      Right lower leg: No edema.      Left lower leg: No edema.   Skin:     Coloration: Skin is not jaundiced.      Findings: No bruising or lesion.   Neurological:      General: No focal deficit present.      Mental Status: He is alert and oriented to person, place, and time.      Motor: No weakness.   Psychiatric:         Mood and Affect: Mood normal.         Behavior: Behavior normal.         Thought Content: Thought content normal.         Judgment: Judgment normal.            Pertinent  and/or Most Recent Results     LAB RESULTS:      Lab 04/11/22  0248 04/10/22  0338 04/09/22  1649   WBC 6.70 8.00 10.20   HEMOGLOBIN 12.6* 13.4 15.7   HEMATOCRIT 36.3* 37.7 43.7   PLATELETS 186 197 208   NEUTROS ABS 4.00 5.00 7.30*   LYMPHS ABS 1.60 1.70 1.80   MONOS ABS 0.80 1.00* 0.90   EOS ABS 0.30 0.20 0.10   MCV 91.5 90.4 90.3         Lab 04/11/22  0248 04/10/22  0338 04/09/22  1649   SODIUM 136 136 137   POTASSIUM 4.0 4.2 4.4   CHLORIDE 103 105 101   CO2 23.0 22.0 22.0   ANION GAP 10.0 9.0 14.0   BUN 9 8 9   CREATININE 0.88 0.91 1.08   EGFR 88.0 86.3 70.2   GLUCOSE 90 106* 94   CALCIUM 8.3* 7.9* 9.4   MAGNESIUM  1.9 1.8 2.0         Lab 04/09/22  1649   TOTAL PROTEIN 7.1   ALBUMIN 4.30   GLOBULIN 2.8   ALT (SGPT) 15   AST (SGOT) 24   BILIRUBIN 0.6   ALK PHOS 86                     Brief Urine Lab Results     None        Microbiology Results (last 10 days)     ** No results found for the last 240 hours. **          XR Abdomen KUB    Result Date: 4/10/2022  Impression: Several mildly dilated small bowel loops appears slightly improved, suggesting improving small bowel obstruction.  Electronically Signed By-Oli Cook MD On:4/10/2022 2:37 PM This report was finalized on [id number] by  Oli Cook MD.    XR Abdomen KUB    Result Date: 4/9/2022  Impression: Several dilated small bowel loops, compatible with clinical history of small bowel obstruction.  Electronically Signed By-Oli Cook MD On:4/9/2022 5:42 PM This report was finalized on [id number] by  Oli Cook MD.                  Labs Pending at Discharge:  Pending Labs     Order Current Status    H. Pylori Antigen, Stool - Stool, Per Rectum In process          Procedures Performed           Consults:   Consults     Date and Time Order Name Status Description    4/9/2022  4:26 PM Inpatient General Surgery Consult Completed     4/9/2022  4:06 PM Inpatient Gastroenterology Consult              Discharge Details        Discharge Medications      New Medications      Instructions Start Date   hydrocortisone 25 MG suppository  Commonly known as: ANUSOL-HC   25 mg, Rectal, 2 Times Daily      pantoprazole 40 MG EC tablet  Commonly known as: PROTONIX   40 mg, Oral, Every Early Morning   Start Date: April 12, 2022        Continue These Medications      Instructions Start Date   amitriptyline 25 MG tablet  Commonly known as: ELAVIL   25 mg, Oral, Nightly      amLODIPine 5 MG tablet  Commonly known as: NORVASC   5 mg, Oral, Daily      aspirin 81 MG EC tablet   81 mg, Oral, Takes 3 days a week      benazepril 10 MG tablet  Commonly known as: LOTENSIN   10  mg, Oral, Daily      Calcium-Vitamin D 600-125 MG-UNIT tablet   1 tablet, Oral, Daily      cetirizine 10 MG tablet  Commonly known as: zyrTEC   10 mg, Oral, Daily      Cholecalciferol 25 MCG (1000 UT) capsule   Oral      FEXOFENADINE HCL PO   Oral, Daily, Unknown dosage      Magnesium 250 MG tablet   1 tablet, Oral, Daily      montelukast 10 MG tablet  Commonly known as: SINGULAIR   10 mg, Oral, Daily      POTASSIUM GLUCONATE PO   Oral, Daily      rosuvastatin 10 MG tablet  Commonly known as: CRESTOR   10 mg, Oral, Daily      Vitamin B-Complex tablet   1 capsule, Oral, Daily         Stop These Medications    omeprazole OTC 20 MG EC tablet  Commonly known as: PriLOSEC OTC            Allergies   Allergen Reactions   • Acetaminophen Unknown - Low Severity     Acetaminophen-propoxyphene   • Ibuprofen Unknown - Low Severity   • Other Unknown - Low Severity     Staflex   • Oxycodone-Aspirin Unknown - Low Severity     Percodan   • Penicillins Unknown - Low Severity   • Sulfa Antibiotics Unknown - Low Severity   • Vioxx [Rofecoxib] Unknown - Low Severity         Discharge Disposition: Home  Home or Self Care   Condition on discharge: GOOD    Diet:  Hospital:  Diet Order   Procedures   • Diet Gastrointestinal; Low Residue         Discharge Activity:   Activity Instructions     Activity as Tolerated              CODE STATUS:  Code Status and Medical Interventions:   Ordered at: 04/09/22 9276     Level Of Support Discussed With:    Patient     Code Status (Patient has no pulse and is not breathing):    CPR (Attempt to Resuscitate)     Medical Interventions (Patient has pulse or is breathing):    Full Support         Future Appointments   Date Time Provider Department Center   7/7/2022  8:00 AM Lynda Trevino APRN [facility]       Additional Instructions for the Follow-ups that You Need to Schedule     Call MD With Problems / Concerns   As directed      Instructions: Call [phone] or email  [email or web address] for problems or concerns.    Order Comments: Instructions: Call [phone] or email [email or web address] for problems or concerns.          Discharge Follow-up with PCP   As directed       Currently Documented PCP:    Iker Artis MD    PCP Phone Number:    [phone]     Follow Up Details: Follow up with PCP in 5-7 days         Discharge Follow-up with Specified Provider: Dr. Mullen; 2 Weeks   As directed      To: Dr. Mullen    Follow Up: 2 Weeks               Time spent on Discharge including face to face service:  25 minutes    This patient has been examined wearing appropriate Personal Protective Equipment and discussed with PAtient. 04/11/22      Signature: Electronically signed by Milad Kilgore DO, 04/11/22, 11:19 AM EDT.

## 2022-04-11 NOTE — PLAN OF CARE
Goal Outcome Evaluation:       VSS. Tolerating diet without nausea/vomiting. Denies c/o abdominal pain. Pt to dc home today.

## 2022-04-11 NOTE — PROGRESS NOTES
LOS: 1 day   Patient Care Team:  Iker Artis MD as PCP - General (Family Medicine)      Subjective     Subjective: Patient reports feeling well today.  He is no longer having abdominal pain.  Having bowel movements with laxatives.  No rectal bleeding.  Eating well without nausea/vomiting.      ROS:   Review of Systems   Respiratory: Negative for cough and shortness of breath.    Cardiovascular: Negative for chest pain and palpitations.   Gastrointestinal: Positive for diarrhea. Negative for abdominal pain, blood in stool, nausea and vomiting.   Neurological: Negative for dizziness and weakness.   Psychiatric/Behavioral: Negative for agitation and confusion.        Medication Review:   Scheduled Meds:hydrocortisone, 25 mg, Rectal, BID  pantoprazole, 40 mg, Oral, Q AM  polyethylene glycol, 17 g, Oral, BID  senna-docusate sodium, 2 tablet, Oral, BID  sodium chloride, 10 mL, Intravenous, Q12H      Continuous Infusions:   PRN Meds:.•  acetaminophen **OR** acetaminophen **OR** acetaminophen  •  aluminum-magnesium hydroxide-simethicone  •  senna-docusate sodium **AND** polyethylene glycol **AND** bisacodyl **AND** bisacodyl  •  ondansetron **OR** ondansetron  •  oxyCODONE  •  oxyCODONE  •  sodium chloride  •  witch hazel-glycerin      Objective     Vital Signs  Temp:  [97.7 °F (36.5 °C)-97.9 °F (36.6 °C)] 97.7 °F (36.5 °C)  Heart Rate:  [52-69] 52  Resp:  [16-18] 16  BP: (103-122)/(64-79) 117/71    Physical Exam:    General Appearance:    Awake and alert, in no acute distress   Head:    Normocephalic, without obvious abnormality   Eyes:          Conjunctivae normal, anicteric sclera   Ears:    Hearing intact   Throat:   No oral lesions, no thrush, oral mucosa moist   Neck:   No adenopathy, supple, no JVD   Lungs:     Respirations regular, even and unlabored       Abdomen:     Soft, non-tender, no rebound or guarding, non-distended, no hepatosplenomegaly   Rectal:     Deferred   Extremities:   No edema, no cyanosis,  no redness   Skin:   No bleeding, bruising or rash, no jaundice   Neurologic:   Cranial nerves 2 - 12 grossly intact, no asterixis, sensation   intact        Results Review:    CBC  Results from last 7 days   Lab Units 04/11/22  0248 04/10/22  0338 04/09/22  1649   RBC 10*6/mm3 3.97* 4.17 4.84   WBC 10*3/mm3 6.70 8.00 10.20   HEMOGLOBIN g/dL 12.6* 13.4 15.7   PLATELETS 10*3/mm3 186 197 208       CMP  Results from last 7 days   Lab Units 04/11/22  0248 04/10/22  0338 04/09/22  1649   SODIUM mmol/L 136 136 137   POTASSIUM mmol/L 4.0 4.2 4.4   CHLORIDE mmol/L 103 105 101   CO2 mmol/L 23.0 22.0 22.0   BUN mg/dL 9 8 9   CREATININE mg/dL 0.88 0.91 1.08   GLUCOSE mg/dL 90 106* 94   ALBUMIN g/dL  --   --  4.30   BILIRUBIN mg/dL  --   --  0.6   ALK PHOS U/L  --   --  86   AST (SGOT) U/L  --   --  24   ALT (SGPT) U/L  --   --  15       Amylase and Lipase        CRP         Imaging Results (Last 24 Hours)     Procedure Component Value Units Date/Time    XR Abdomen KUB [[id number]] Collected: 04/10/22 1435     Updated: 04/10/22 1440    Narrative:      XR ABDOMEN KUB-     Date of Exam: 4/10/2022 2:12 PM     Indication: Abdominal Pain.     Comparison Exams: April 19, 2022     Technique: Single AP radiograph of the abdomen     FINDINGS:  Several mildly dilated small bowel loops appear slightly improved. Mild  stool is present within the colon. No pathological calcifications are  identified. The osseous structures appear intact. Right upper quadrant  cholecystectomy clips are noted.       Impression:      Several mildly dilated small bowel loops appears slightly improved,  suggesting improving small bowel obstruction.     Electronically Signed By-Oli Cook MD On:4/10/2022 2:37 PM  This report was finalized on [id number] by  Oli Cook MD.            Assessment/Plan     Abnormal CT showing small bowel obstruction  Lower abdominal pain  History of cholecystectomy  Chronic constipation  GERD stable on  PPI  Hypertension  Sleep apnea  H. pylori gastritis      Plan:  Patient reports doing well today.  His lower abdominal pain has resolved.  He is having loose bowel movements after receiving laxatives.  Eating solid food without nausea/vomiting.  He is planning to be discharged home today.  Follow-up KUB that was performed yesterday afternoon showed mildly dilated small bowel loops that appeared slightly improved.  Recommend he follow-up with GI as outpatient and he would prefer to be seen at [facility].  Continue PPI.  We will follow-up on H. pylori stool antigen testing.  Low residue diet.    Nicolle Abdalla, TRE  04/11/22  10:42 EDT

## 2022-04-12 NOTE — CASE MANAGEMENT/SOCIAL WORK
Case Management Discharge Note      Final Note: home         Selected Continued Care - Discharged on 4/11/2022 Admission date: 4/9/2022 - Discharge disposition: Home or Self Care            Transportation Services  Private: Car    Final Discharge Disposition Code: 01 - home or self-care

## 2022-04-13 LAB — H PYLORI AG STL QL IA: POSITIVE

## 2022-04-28 ENCOUNTER — OFFICE (OUTPATIENT)
Dept: URBAN - METROPOLITAN AREA CLINIC 64 | Facility: CLINIC | Age: 77
End: 2022-04-28

## 2022-04-28 VITALS
HEART RATE: 68 BPM | SYSTOLIC BLOOD PRESSURE: 142 MMHG | WEIGHT: 189 LBS | DIASTOLIC BLOOD PRESSURE: 84 MMHG | HEIGHT: 68 IN

## 2022-04-28 DIAGNOSIS — K21.9 GASTRO-ESOPHAGEAL REFLUX DISEASE WITHOUT ESOPHAGITIS: ICD-10-CM

## 2022-04-28 PROCEDURE — 99213 OFFICE O/P EST LOW 20 MIN: CPT | Performed by: NURSE PRACTITIONER

## 2022-05-23 ENCOUNTER — TELEPHONE (OUTPATIENT)
Dept: NEUROLOGY | Facility: CLINIC | Age: 77
End: 2022-05-23

## 2022-05-23 NOTE — TELEPHONE ENCOUNTER
He has asked to come off the amitriptyline at last visit.  I am confused as to whether he is actually taking it.  It he has a lapse in taking it, it probably hasn't re-reached efficacy. Needs to be seen to discuss medication changes

## 2022-05-23 NOTE — TELEPHONE ENCOUNTER
PT IS CALLING BACK TO INFORM US THAT HE IS ALSO ON RX  PEPTO BISMOL TAKES TWO A DAY UNTIL WED 05.24.22, OMEPRAZOLE 20 MG  2X DAY ,  METRONIDAZOLE 250 MG 4X DAY , AND PETRACYCLINE 500 MG 4X DAY  JUST WANTED OFFICE TO KNOW IN CASE THIS HAD ANYTHING TO DO WITH WHAT CLIVE PRESCRIBED .     PLEASE ADVISE.

## 2022-05-23 NOTE — TELEPHONE ENCOUNTER
Caller: Jamie Lacho Patel    Relationship: Self    Best call back number: [phone]    Preferred pharmacy: [facility] - [facility] [address], [address] - [phone] [facility] [phone] FX    What medications are you currently taking:   Current Outpatient Medications on File Prior to Visit   Medication Sig Dispense Refill   • amitriptyline (ELAVIL) 25 MG tablet Take 1 tablet by mouth Every Night. 90 tablet 1   • amLODIPine (NORVASC) 5 MG tablet Take 5 mg by mouth Daily.     • aspirin 81 MG EC tablet Take 81 mg by mouth. Takes 3 days a week     • B Complex Vitamins (Vitamin B-Complex) tablet Take 1 capsule by mouth Daily.     • benazepril (LOTENSIN) 10 MG tablet Take 10 mg by mouth Daily.     • Calcium Carbonate-Vitamin D (Calcium-Vitamin D) 600-125 MG-UNIT tablet Take 1 tablet by mouth Daily.     • cetirizine (zyrTEC) 10 MG tablet Take 10 mg by mouth Daily.     • Cholecalciferol 25 MCG (1000 UT) capsule Take  by mouth.     • FEXOFENADINE HCL PO Take  by mouth Daily. Unknown dosage     • Magnesium 250 MG tablet Take 1 tablet by mouth Daily.     • montelukast (SINGULAIR) 10 MG tablet Take 10 mg by mouth Daily.     • pantoprazole (PROTONIX) 40 MG EC tablet Take 1 tablet by mouth Every Morning. 30 tablet 0   • POTASSIUM GLUCONATE PO Take  by mouth Daily.     • rosuvastatin (CRESTOR) 10 MG tablet Take 10 mg by mouth Daily.       No current facility-administered medications on file prior to visit.     Which medication are you concerned about: amitriptyline (ELAVIL) 25 MG tablet- Take 1 tablet by mouth Every Night.    Who prescribed you this medication: TRE BENNETT    When did you start taking these medications: 10/18/21    What are your concerns: PT STATES THAT MEDICATION SEEMED TO HELP WITH MIGRAINES WHEN HE FIRST STARTED TAKING, HOWEVER, PT STATES THAT WITHIN THE LAST WEEK, HIS HEADACHES HAVE RETURNED. PT RATES HEADACHE & LEFT EYE PAIN A 7/10 ON PAIN SCALE. PT WORRIES THAT  MEDICATION IS NOT WORKING ANY LONGER TO PREVENT HEADACHES. PT STATES HE IS STILL TAKING MEDICATION AS PRESCRIBED.    How long have you had these concerns: APPROXIMATELY 1 WEEK    PT IS WONDERING IF TRE BENNETT WOULD LIKE TO ADJUST MEDICATION OR IF SHE WOULD LIKE TO MOVE UP HIS F/U APPT TO FURTHER DISCUSS. PT SCHEDULED FOR F/U ON 7/7/22.    PLEASE REVIEW AND ADVISE.

## 2022-05-24 NOTE — TELEPHONE ENCOUNTER
spoke with pt-he states that he never stopped the Amitriptyline. He is aware that we can discuss other options in follow up, he has an appt in July-I will keep an eye out for a sooner appt., he voices understanding.

## 2022-05-31 RX ORDER — AMITRIPTYLINE HYDROCHLORIDE 25 MG/1
TABLET, FILM COATED ORAL
Qty: 90 TABLET | Refills: 3 | OUTPATIENT
Start: 2022-05-31

## 2022-07-07 ENCOUNTER — OFFICE VISIT (OUTPATIENT)
Dept: NEUROLOGY | Facility: CLINIC | Age: 77
End: 2022-07-07

## 2022-07-07 VITALS
HEART RATE: 68 BPM | DIASTOLIC BLOOD PRESSURE: 75 MMHG | WEIGHT: 179.2 LBS | SYSTOLIC BLOOD PRESSURE: 135 MMHG | HEIGHT: 68 IN | BODY MASS INDEX: 27.16 KG/M2

## 2022-07-07 DIAGNOSIS — R51.9 CHRONIC INTRACTABLE HEADACHE, UNSPECIFIED HEADACHE TYPE: Primary | ICD-10-CM

## 2022-07-07 DIAGNOSIS — G89.29 CHRONIC INTRACTABLE HEADACHE, UNSPECIFIED HEADACHE TYPE: Primary | ICD-10-CM

## 2022-07-07 PROCEDURE — 99213 OFFICE O/P EST LOW 20 MIN: CPT | Performed by: NURSE PRACTITIONER

## 2022-07-07 RX ORDER — AMITRIPTYLINE HYDROCHLORIDE 25 MG/1
50 TABLET, FILM COATED ORAL NIGHTLY
Qty: 180 TABLET | Refills: 1 | Status: SHIPPED | OUTPATIENT
Start: 2022-07-07 | End: 2022-10-05

## 2022-07-07 NOTE — PROGRESS NOTES
"Chief Complaint  Neurologic Problem (HA/retro orbital eye pain)    Subjective          Lacho Ayala presents to [facility]  Had increase in headaches.  PCP increased amitriptyline to 1.5 tablets nightly a few weeks ago.  He feels he's seen some improvement with that dose.     Interval History:  Having left sided headache for about the past year.  States that it is often retro-orbital and feels like pressure and sharp.  Occasionally will have a holocranial headache.  Denies photophobia, phonophobia or nausea.  Endorses fatigue.  Has history of RA. States he's having headache most days out of the week.  Uses Tylenol for abortive therapy.        Objective   Vital Signs:   /75   Pulse 68   Ht 172.7 cm (68\")   Wt 81.3 kg (179 lb 3.2 oz)   BMI 27.25 kg/m²     Physical Exam  HENT:      Head: Normocephalic.   Pulmonary:      Effort: Pulmonary effort is normal.   Neurological:      Mental Status: He is alert and oriented to person, place, and time.      Cranial Nerves: Cranial nerves are intact.      Sensory: Sensation is intact.      Motor: Motor function is intact.      Coordination: Coordination is intact.      Deep Tendon Reflexes: Reflexes are normal and symmetric.        Neurologic Exam     Mental Status   Oriented to person, place, and time.        Result Review :               Assessment and Plan    Diagnoses and all orders for this visit:    1. Chronic intractable headache, unspecified headache type (Primary)  Assessment & Plan:  Continue amitriptyline 1.5 tablets nightly.  If pain returns, can increase to 2 tablets nightly.       Other orders  -     amitriptyline (ELAVIL) 25 MG tablet; Take 2 tablets by mouth Every Night for 90 days. 1 and 1/2 tablets nightly  Dispense: 180 tablet; Refill: 1      Follow Up   Return in about 6 months (around 1/7/2023) for headache.  Patient was given instructions and counseling regarding his condition or for health " maintenance advice. Please see specific information pulled into the AVS if appropriate.

## 2022-07-27 ENCOUNTER — OFFICE (OUTPATIENT)
Dept: RURAL CLINIC 3 | Facility: CLINIC | Age: 77
End: 2022-07-27

## 2022-07-27 VITALS
DIASTOLIC BLOOD PRESSURE: 80 MMHG | WEIGHT: 179 LBS | HEART RATE: 72 BPM | SYSTOLIC BLOOD PRESSURE: 121 MMHG | HEIGHT: 68 IN

## 2022-07-27 DIAGNOSIS — K59.00 CONSTIPATION, UNSPECIFIED: ICD-10-CM

## 2022-07-27 PROCEDURE — 99214 OFFICE O/P EST MOD 30 MIN: CPT | Performed by: INTERNAL MEDICINE

## 2022-07-27 RX ORDER — OMEPRAZOLE 20 MG/1
20 CAPSULE, DELAYED RELEASE ORAL
Qty: 0 | Refills: 0 | Status: COMPLETED
End: 2022-07-27

## 2022-09-08 RX ORDER — OMEPRAZOLE 20 MG/1
20 CAPSULE, DELAYED RELEASE ORAL 2 TIMES DAILY
COMMUNITY

## 2022-09-13 ENCOUNTER — ANESTHESIA EVENT (OUTPATIENT)
Dept: GASTROENTEROLOGY | Facility: HOSPITAL | Age: 77
End: 2022-09-13

## 2022-09-13 PROBLEM — R19.8 ALTERED BOWEL FUNCTION: Status: ACTIVE | Noted: 2022-09-13

## 2022-09-13 PROBLEM — A04.8 BACTERIAL INFECTION DUE TO HELICOBACTER PYLORI: Status: ACTIVE | Noted: 2022-09-13

## 2022-09-13 RX ORDER — SODIUM CHLORIDE 0.9 % (FLUSH) 0.9 %
10 SYRINGE (ML) INJECTION AS NEEDED
Status: CANCELLED | OUTPATIENT
Start: 2022-09-13

## 2022-09-13 RX ORDER — SODIUM CHLORIDE 0.9 % (FLUSH) 0.9 %
10 SYRINGE (ML) INJECTION EVERY 12 HOURS SCHEDULED
Status: CANCELLED | OUTPATIENT
Start: 2022-09-13

## 2022-09-13 RX ORDER — SODIUM CHLORIDE 9 MG/ML
9 INJECTION, SOLUTION INTRAVENOUS CONTINUOUS PRN
Status: CANCELLED | OUTPATIENT
Start: 2022-09-13

## 2022-09-14 ENCOUNTER — HOSPITAL ENCOUNTER (OUTPATIENT)
Facility: HOSPITAL | Age: 77
Setting detail: HOSPITAL OUTPATIENT SURGERY
Discharge: HOME | End: 2022-09-14
Attending: INTERNAL MEDICINE | Admitting: INTERNAL MEDICINE

## 2022-09-14 ENCOUNTER — ON CAMPUS - OUTPATIENT (OUTPATIENT)
Dept: URBAN - METROPOLITAN AREA HOSPITAL 85 | Facility: HOSPITAL | Age: 77
End: 2022-09-14

## 2022-09-14 ENCOUNTER — ANESTHESIA (OUTPATIENT)
Dept: GASTROENTEROLOGY | Facility: HOSPITAL | Age: 77
End: 2022-09-14

## 2022-09-14 VITALS
BODY MASS INDEX: 27.28 KG/M2 | TEMPERATURE: 98.5 F | HEART RATE: 58 BPM | RESPIRATION RATE: 16 BRPM | WEIGHT: 180 LBS | HEIGHT: 68 IN | OXYGEN SATURATION: 98 % | DIASTOLIC BLOOD PRESSURE: 70 MMHG | SYSTOLIC BLOOD PRESSURE: 111 MMHG

## 2022-09-14 VITALS — OXYGEN SATURATION: 100 % | SYSTOLIC BLOOD PRESSURE: 97 MMHG | DIASTOLIC BLOOD PRESSURE: 68 MMHG | HEART RATE: 68 BPM

## 2022-09-14 DIAGNOSIS — Z86.010 PERSONAL HISTORY OF COLONIC POLYPS: ICD-10-CM

## 2022-09-14 DIAGNOSIS — K20.80 OTHER ESOPHAGITIS WITHOUT BLEEDING: ICD-10-CM

## 2022-09-14 DIAGNOSIS — R19.4 FREQUENT BOWEL MOVEMENTS: ICD-10-CM

## 2022-09-14 DIAGNOSIS — K25.9 GASTRIC ULCER DUE TO HELICOBACTER PYLORI: ICD-10-CM

## 2022-09-14 DIAGNOSIS — B96.81 GASTRIC ULCER DUE TO HELICOBACTER PYLORI: ICD-10-CM

## 2022-09-14 DIAGNOSIS — K57.30 DIVERTICULOSIS OF LARGE INTESTINE WITHOUT PERFORATION OR ABS: ICD-10-CM

## 2022-09-14 DIAGNOSIS — K64.1 SECOND DEGREE HEMORRHOIDS: ICD-10-CM

## 2022-09-14 DIAGNOSIS — B96.81 HELICOBACTER PYLORI [H. PYLORI] AS THE CAUSE OF DISEASES CLA: ICD-10-CM

## 2022-09-14 DIAGNOSIS — D12.2 BENIGN NEOPLASM OF ASCENDING COLON: ICD-10-CM

## 2022-09-14 DIAGNOSIS — K59.00 CONSTIPATION: ICD-10-CM

## 2022-09-14 DIAGNOSIS — K29.80 DUODENITIS WITHOUT BLEEDING: ICD-10-CM

## 2022-09-14 DIAGNOSIS — K29.70 GASTRITIS, UNSPECIFIED, WITHOUT BLEEDING: ICD-10-CM

## 2022-09-14 PROCEDURE — 87205 SMEAR GRAM STAIN: CPT | Performed by: INTERNAL MEDICINE

## 2022-09-14 PROCEDURE — 88305 TISSUE EXAM BY PATHOLOGIST: CPT | Performed by: INTERNAL MEDICINE

## 2022-09-14 PROCEDURE — 87081 CULTURE SCREEN ONLY: CPT | Performed by: INTERNAL MEDICINE

## 2022-09-14 PROCEDURE — 45385 COLONOSCOPY W/LESION REMOVAL: CPT | Mod: PT | Performed by: INTERNAL MEDICINE

## 2022-09-14 PROCEDURE — 25010000002 PROPOFOL 10 MG/ML EMULSION: Performed by: ANESTHESIOLOGY

## 2022-09-14 PROCEDURE — 88342 IMHCHEM/IMCYTCHM 1ST ANTB: CPT | Performed by: INTERNAL MEDICINE

## 2022-09-14 PROCEDURE — 43239 EGD BIOPSY SINGLE/MULTIPLE: CPT | Performed by: INTERNAL MEDICINE

## 2022-09-14 PROCEDURE — 87641 MR-STAPH DNA AMP PROBE: CPT

## 2022-09-14 RX ORDER — PROPOFOL 10 MG/ML
VIAL (ML) INTRAVENOUS AS NEEDED
Status: DISCONTINUED | OUTPATIENT
Start: 2022-09-14 | End: 2022-09-14 | Stop reason: SURG

## 2022-09-14 RX ORDER — LIDOCAINE HYDROCHLORIDE 20 MG/ML
INJECTION, SOLUTION EPIDURAL; INFILTRATION; INTRACAUDAL; PERINEURAL AS NEEDED
Status: DISCONTINUED | OUTPATIENT
Start: 2022-09-14 | End: 2022-09-14 | Stop reason: SURG

## 2022-09-14 RX ORDER — ONDANSETRON 2 MG/ML
4 INJECTION INTRAMUSCULAR; INTRAVENOUS ONCE AS NEEDED
Status: DISCONTINUED | OUTPATIENT
Start: 2022-09-14 | End: 2022-09-14 | Stop reason: HOSPADM

## 2022-09-14 RX ORDER — SODIUM CHLORIDE 9 MG/ML
INJECTION, SOLUTION INTRAVENOUS CONTINUOUS PRN
Status: DISCONTINUED | OUTPATIENT
Start: 2022-09-14 | End: 2022-09-14 | Stop reason: SURG

## 2022-09-14 RX ADMIN — PROPOFOL 225 MG: 10 INJECTION, EMULSION INTRAVENOUS at 10:28

## 2022-09-14 RX ADMIN — SODIUM CHLORIDE: 0.9 INJECTION, SOLUTION INTRAVENOUS at 10:24

## 2022-09-14 RX ADMIN — LIDOCAINE HYDROCHLORIDE 80 MG: 20 INJECTION, SOLUTION EPIDURAL; INFILTRATION; INTRACAUDAL; PERINEURAL at 10:28

## 2022-09-14 NOTE — DISCHARGE INSTRUCTIONS
A responsible adult should stay with you and you should rest quietly for the rest of the day.    Do not drink alcohol, drive, operate any heavy machinery or power tools or make any legal/important decisions for the next 24 hours.     Progress your diet as tolerated.  If you begin to experience severe pain, increased shortness of breath, racing heartbeat or a fever above 101 F, seek immediate medical attention.     Follow up with MD as instructed. Call office for results in 3 to 5 days if needed.    [phone]      Findings:   Terminal ileum: Normal mucosa distal 10 cm  Colon: Normal mucosa to cecum.  A 3 mm sessile polyp was removed from the ascending colon with cold snare single piece polypectomy retrieved.  Moderate sigmoid colon diverticulosis without diverticulitis or bleeding  Grade 2 internal hemorrhoids     Impression:  77-year-old male with refractory/resistant H. pylori infection.  EGD today with biopsy and culture of H. pylori was done for sensitivities.  Colonoscopy today showed a small polyp diverticulosis and hemorrhoids.     Recommendations:  Follow-up on pathology  Treat H. pylori if positive based on culture sensitivities  No recall on colonoscopy  Follow-up with GI nurse practitioner in 2 to 3 months

## 2022-09-14 NOTE — OP NOTE
ESOPHAGOGASTRODUODENOSCOPY, COLONOSCOPY Procedure Report    Patient Name:  Lacho Ayala  YOB: 1945    Date of Surgery:  9/14/2022     Preop diagnosis:  Persistent/refractory H. pylori infection  Personal history of colon polyps    Postop diagnosis:  Grade B erosive esophagitis  Diffuse nonerosive gastritis  Mild duodenitis  Colon polyp x1  Moderate sigmoid colon diverticulosis without diverticulitis or bleeding  Grade 2 internal hemorrhoids    Procedure(s):  ESOPHAGOGASTRODUODENOSCOPY WITH CULTURE FOR H.PYLORI  BIOPSY X1  COLONOSCOPY WITH snare polypectomy      Staff:  Surgeon(s):  Phillip Mack MD      Anesthesia: Monitored Anesthesia Care    Implants:    Nothing was implanted during the procedure    Specimen:        See Below    Estimated blood loss: Minimal     Complications:  None    Description of Procedure:  Informed consent was obtained for the procedure, including sedation.  Risks of perforation, hemorrhage, adverse drug reaction and aspiration were discussed.  The patient was brought into the endoscopy suite. Continuous cardiopulmonary monitoring was performed. The patient was placed in the left lateral decubitus position.  The bite block was inserted into the patient's mouth. After adequate sedation was attained, the Olympus gastroscope was inserted into the patient's mouth and advanced to the second portion of the duodenum without difficulty.  Circumferential examination was performed. A retroflex exam was performed in the patient's stomach.  On completion of the exam, the bowel was decompressed, the scope was removed from the patient, the patient tolerated the procedure well, there were no immediate post-operative complications.     Examination of the esophagus: Grade B esophagitis  Examination of the stomach: Diffuse erythema and congestion throughout the stomach consistent with chronic gastritis.  Cold forceps biopsies were obtained for H. pylori culture and  immunohistochemical staining as well as histology.  Examination of the duodenum: Mild duodenitis in the bulb    Subsequently,  the Olympus colonoscope was inserted into the patient's rectum and advanced to the level of the cecum and terminal ileum without difficulty.  The bowel prep was excellent.  Circumferential examination of the patient's colon was performed on scope withdrawal.  The cecum, ascending colon, and hepatic flexure were examined twice.  The transverse colon, splenic flexure, descending, sigmoid colon and rectum were examined.  A retroflex exam was performed in the rectum.  The bowel was decompressed, the scope was withdrawn from the patient, and the patient tolerated the procedure well. There were no immediate post-operative complications.     Findings:   Terminal ileum: Normal mucosa distal 10 cm  Colon: Normal mucosa to cecum.  A 3 mm sessile polyp was removed from the ascending colon with cold snare single piece polypectomy retrieved.  Moderate sigmoid colon diverticulosis without diverticulitis or bleeding  Grade 2 internal hemorrhoids    Impression:  77-year-old male with refractory/resistant H. pylori infection.  EGD today with biopsy and culture of H. pylori was done for sensitivities.  Colonoscopy today showed a small polyp diverticulosis and hemorrhoids.    Recommendations:  Follow-up on pathology  Treat H. pylori if positive based on culture sensitivities  No recall on colonoscopy  Follow-up with GI nurse practitioner in 2 to 3 months    We appreciate the referral    Electronically signed by Phillip Mack MD, 09/14/22, 10:55 AM EDT.

## 2022-09-14 NOTE — ANESTHESIA POSTPROCEDURE EVALUATION
Patient: Lacho Ayala    Procedure Summary     Date: 09/14/22 Room / Location: [facility] ENDOSCOPY 1 / [facility] ENDOSCOPY    Anesthesia Start: 1024 Anesthesia Stop: 1050    Procedures:       ESOPHAGOGASTRODUODENOSCOPY WITH CULTURE FOR H.PYLORI  BIOPSY X1 (N/A )      COLONOSCOPY WITH BIOPSEY X (N/A ) Diagnosis:       Constipation      Gastric ulcer due to Helicobacter pylori      Frequent bowel movements      (Constipation [K59.00])      (Gastric ulcer due to Helicobacter pylori [K25.9, B96.81])      (Frequent bowel movements [R19.4])    Surgeons: Phillip Mack MD Provider: Marcell Briceno MD    Anesthesia Type: MAC ASA Status: 2          Anesthesia Type: MAC    Vitals  Vitals Value Taken Time   /70 09/14/22 1123   Temp     Pulse 58 09/14/22 1123   Resp 16 09/14/22 1123   SpO2 98 % 09/14/22 1123           Post Anesthesia Care and Evaluation    Patient location during evaluation: PACU  Patient participation: complete - patient participated  Level of consciousness: awake  Pain scale: See nurse's notes for pain score.  Pain management: adequate    Airway patency: patent  Anesthetic complications: No anesthetic complications  PONV Status: none  Cardiovascular status: acceptable  Respiratory status: acceptable  Hydration status: acceptable    Comments: Patient seen and examined postoperatively; vital signs stable; SpO2 greater than or equal to 90%; cardiopulmonary status stable; nausea/vomiting adequately controlled; pain adequately controlled; no apparent anesthesia complications; patient discharged from anesthesia care when discharge criteria were met

## 2022-09-14 NOTE — H&P
GI CONSULT  NOTE:    Referring Provider:    Iker Artis MD  [unfilled]    Chief complaint: Bacterial infection due to Helicobacter pylori    History of present illness:      Lacho Ayala is a 77 y.o. male who presents today for Procedure(s):  ESOPHAGOGASTRODUODENOSCOPY  COLONOSCOPY for the indications listed below.     The updated Patient Profile was reviewed prior to the procedure, in conjunction with the Physical Exam, including medical conditions, surgical procedures, medications, allergies, family history and social history.     Pre-operatively, I reviewed the indication(s) for the procedure, the risks of the procedure [including but not limited to: unexpected bleeding possibly requiring hospitalization and/or unplanned repeat procedures, perforation possibly requiring surgical treatment, missed lesions and complications of sedation/MAC (also explained by anesthesia staff)].     I have evaluated the patient for risks associated with the planned anesthesia and the procedure to be performed and find the patient an acceptable candidate for IV sedation.    Multiple opportunities were provided for any questions or concerns, and all questions were answered satisfactorily before any anesthesia was administered. We will proceed with the planned procedure.    Past Medical History:  Past Medical History:   Diagnosis Date   • Arthritis    • Bowel obstruction (HCC) 2022    resolved on own   • Constipation    • GERD (gastroesophageal reflux disease)    • Hypertension    • Shingles     HX   • Sleep apnea     CPAP       Past Surgical History:  Past Surgical History:   Procedure Laterality Date   • BACK SURGERY  1985   • CATARACT EXTRACTION     • GALLBLADDER SURGERY  2000   • HAND SURGERY Right 1967   • KNEE SURGERY Left 1990/1993       Social History:  Social History     Tobacco Use   • Smoking status: Former Smoker   • Smokeless tobacco: Never Used   Vaping Use   • Vaping Use: Never used   Substance Use  "Topics   • Alcohol use: Never   • Drug use: Not Currently       Family History:  Family History   Problem Relation Age of Onset   • Heart disease Mother    • Hypertension Mother    • Alzheimer's disease Father    • Diabetes Father    • Dementia Father    • Arthritis Brother    • Diabetes Brother    • Dementia Brother        Medications:  No medications prior to admission.       Scheduled Meds:  Continuous Infusions:No current facility-administered medications for this encounter.    PRN Meds:.    ALLERGIES:  Patient has no known allergies.    ROS:  The following systems were reviewed and negative;   Constitution:  No fevers, chills, no unintentional weight loss  Skin: no rash, no jaundice  Eyes:  No blurry vision, no eye pain  HENT:  No change in hearing or smell  Resp:  No dyspnea or cough  CV:  No chest pain or palpitations  :  No dysuria, hematuria  Musculoskeletal:  No leg cramps or arthralgias  Neuro:  No tremor, no numbness  Psych:  No depression or confusion    Objective     Vital Signs:   Vitals:    09/08/22 1026   Weight: 81.6 kg (180 lb)   Height: 172.7 cm (68\")       Physical Exam:       General Appearance:    Awake and alert, in no acute distress   Head:    Normocephalic, without obvious abnormality, atraumatic   Throat:   No oral lesions, no thrush, oral mucosa moist   Lungs:     respirations regular, even and unlabored   Skin:   No rash, no jaundice       Results Review:  Lab Results (last 24 hours)     ** No results found for the last 24 hours. **          Imaging Results (Last 24 Hours)     ** No results found for the last 24 hours. **           I reviewed the patient's labs and imaging.    ASSESSMENT AND PLAN:      Principal Problem:    Bacterial infection due to Helicobacter pylori  Active Problems:    Altered bowel function       Procedure(s):  ESOPHAGOGASTRODUODENOSCOPY  COLONOSCOPY      I discussed the patients findings and my recommendations with the patient.    Electronically signed by " Phillip Mack MD, 09/13/22, 9:17 PM EDT.

## 2022-09-14 NOTE — ANESTHESIA PREPROCEDURE EVALUATION
Anesthesia Evaluation     Patient summary reviewed and Nursing notes reviewed   NPO Solid Status: > 8 hours  NPO Liquid Status: > 8 hours           Airway   Mallampati: II  TM distance: >3 FB  Neck ROM: full  No difficulty expected  Dental - normal exam     Pulmonary - normal exam   (+) sleep apnea,   Cardiovascular - normal exam    (+) hypertension, hyperlipidemia,       Neuro/Psych  (+) headaches,    GI/Hepatic/Renal/Endo    (+)  GERD,      Musculoskeletal     Abdominal  - normal exam    Bowel sounds: normal.   Substance History      OB/GYN          Other   arthritis,                      Anesthesia Plan    ASA 2     MAC     intravenous induction     Anesthetic plan, risks, benefits, and alternatives have been provided, discussed and informed consent has been obtained with: patient.        CODE STATUS:

## 2022-09-15 LAB
LAB AP CASE REPORT: NORMAL
PATH REPORT.FINAL DX SPEC: NORMAL
PATH REPORT.GROSS SPEC: NORMAL

## 2022-09-22 LAB
H PYLORI SPEC QL CULT: ABNORMAL
Lab: ABNORMAL

## 2022-10-18 LAB
Lab: NORMAL

## 2022-12-14 ENCOUNTER — OFFICE (OUTPATIENT)
Dept: RURAL CLINIC 3 | Facility: CLINIC | Age: 77
End: 2022-12-14

## 2022-12-14 VITALS
WEIGHT: 188 LBS | SYSTOLIC BLOOD PRESSURE: 131 MMHG | HEIGHT: 68 IN | HEART RATE: 70 BPM | DIASTOLIC BLOOD PRESSURE: 92 MMHG

## 2022-12-14 DIAGNOSIS — B96.81 HELICOBACTER PYLORI [H. PYLORI] AS THE CAUSE OF DISEASES CLA: ICD-10-CM

## 2022-12-14 PROCEDURE — 99213 OFFICE O/P EST LOW 20 MIN: CPT | Performed by: INTERNAL MEDICINE

## 2023-01-09 ENCOUNTER — OFFICE VISIT (OUTPATIENT)
Dept: NEUROLOGY | Facility: CLINIC | Age: 78
End: 2023-01-09
Payer: MEDICARE

## 2023-01-09 VITALS
WEIGHT: 189.7 LBS | HEART RATE: 77 BPM | BODY MASS INDEX: 28.75 KG/M2 | HEIGHT: 68 IN | SYSTOLIC BLOOD PRESSURE: 120 MMHG | DIASTOLIC BLOOD PRESSURE: 65 MMHG

## 2023-01-09 DIAGNOSIS — G89.29 CHRONIC INTRACTABLE HEADACHE, UNSPECIFIED HEADACHE TYPE: Primary | ICD-10-CM

## 2023-01-09 DIAGNOSIS — R51.9 CHRONIC INTRACTABLE HEADACHE, UNSPECIFIED HEADACHE TYPE: Primary | ICD-10-CM

## 2023-01-09 PROCEDURE — 99214 OFFICE O/P EST MOD 30 MIN: CPT | Performed by: NURSE PRACTITIONER

## 2023-01-09 RX ORDER — AMITRIPTYLINE HYDROCHLORIDE 25 MG/1
50 TABLET, FILM COATED ORAL NIGHTLY
Qty: 180 TABLET | Refills: 1 | Status: SHIPPED | OUTPATIENT
Start: 2023-01-09 | End: 2023-01-17 | Stop reason: SDUPTHER

## 2023-01-09 RX ORDER — AMITRIPTYLINE HYDROCHLORIDE 10 MG/1
1 TABLET, FILM COATED ORAL DAILY
COMMUNITY
End: 2023-01-09

## 2023-01-09 NOTE — PROGRESS NOTES
"Chief Complaint  Migraine    Subjective          Lacho Ayala presents to [facility]  History of Present Illness  States he's had an increase in headaches since previous visit, left retro-orbital pain.  Is unsure what dose of amitriptyline he's currently taking, is taking 1.5 tablets.     Interval History:  Having left sided headache for about the past year.  States that it is often retro-orbital and feels like pressure and sharp.  Occasionally will have a holocranial headache.  Denies photophobia, phonophobia or nausea.  Endorses fatigue.  Has history of RA. States he's having headache most days out of the week.  Uses Tylenol for abortive therapy.        Objective   Vital Signs:   /65   Pulse 77   Ht 172.7 cm (68\")   Wt 86 kg (189 lb 11.2 oz)   BMI 28.84 kg/m²     Physical Exam  HENT:      Head: Normocephalic.   Pulmonary:      Effort: Pulmonary effort is normal.   Neurological:      Mental Status: He is alert and oriented to person, place, and time.      Sensory: Sensation is intact.      Motor: Motor function is intact.      Coordination: Coordination is intact.      Deep Tendon Reflexes: Reflexes are normal and symmetric.        Neurologic Exam     Mental Status   Oriented to person, place, and time.        Result Review :               Assessment and Plan    Diagnoses and all orders for this visit:    1. Chronic intractable headache, unspecified headache type (Primary)  Assessment & Plan:  Continue amitriptyline, increase to  2 tablets nightly.       Other orders  -     amitriptyline (ELAVIL) 25 MG tablet; Take 2 tablets by mouth Every Night for 90 days.  Dispense: 180 tablet; Refill: 1      Follow Up   Return in about 6 months (around 7/9/2023) for Migraine f/u.  Patient was given instructions and counseling regarding his condition or for health maintenance advice. Please see specific information pulled into the AVS if appropriate.       "

## 2023-01-17 NOTE — TELEPHONE ENCOUNTER
Caller: JOHN Tesfaye call back number: [phone]    Requested Prescriptions:   AMITRIPTYLINE 25 MG   Requested Prescriptions      No prescriptions requested or ordered in this encounter        Pharmacy where request should be sent: [id number] - [place] - [address] AT [facility] [place] - [phone]  - [phone] FX  [phone]     Additional details provided by patient: [facility] IS MAILING RX BUT WONT GET FOR 7 DAYS . HE NEEDS [facility] ORDER . THEY GAVE HIM OVERRIDE NUMBER # [phone] PLEASE ASAP     Does the patient have less than a 3 day supply:  [x] Yes  [] No    Would you like a call back once the refill request has been completed: [x] Yes [] No    If the office needs to give you a call back, can they leave a voicemail: [x] Yes [] No    PLEASE ADVISE   Orly Iqbal Rep   01/17/23 09:52 EST

## 2023-01-18 RX ORDER — AMITRIPTYLINE HYDROCHLORIDE 25 MG/1
TABLET, FILM COATED ORAL
Qty: 14 TABLET | Refills: 0 | Status: SHIPPED | OUTPATIENT
Start: 2023-01-18

## 2023-01-18 NOTE — TELEPHONE ENCOUNTER
PT CALLING AGAIN AS HE IS OUT OF THE RX TODAY. HE TOOK HIS LAST PILL LAST NIGHT.    Caller: JOHN Tesfaye call back number: [phone]     Requested Prescriptions:   AMITRIPTYLINE 25 MG      Pharmacy where request should be sent: [facility] #[id number] - [place], [place] - [address] AT [facility] [place] - [phone]  - [phone] FX  [phone]     PT IS JUST NEEDING ENOUGH FOR 7 DAYS UNTIL HIS MAIL ORDER SCRIPT WILL ARRIVE.    [facility] GAVE HIM OVERRIDE NUMBER # [phone]     PLEASE CALL PT WHEN THE SCRIPT HAS BEEN SENT TO [facility]. OK TO [facility] IF NO ANSWER.    PT HAS TO BE IN TOWN AT 10 AM AND IS HOPING THE SCRIPT WILL BE READY AT THAT TIME.

## 2023-02-09 ENCOUNTER — OFFICE (OUTPATIENT)
Dept: URBAN - METROPOLITAN AREA CLINIC 64 | Facility: CLINIC | Age: 78
End: 2023-02-09
Payer: COMMERCIAL

## 2023-02-09 VITALS
WEIGHT: 189 LBS | SYSTOLIC BLOOD PRESSURE: 130 MMHG | HEIGHT: 68 IN | DIASTOLIC BLOOD PRESSURE: 82 MMHG | HEART RATE: 70 BPM

## 2023-02-09 DIAGNOSIS — K59.00 CONSTIPATION, UNSPECIFIED: ICD-10-CM

## 2023-02-09 DIAGNOSIS — B96.81 HELICOBACTER PYLORI [H. PYLORI] AS THE CAUSE OF DISEASES CLA: ICD-10-CM

## 2023-02-09 PROCEDURE — 99213 OFFICE O/P EST LOW 20 MIN: CPT | Performed by: NURSE PRACTITIONER

## 2023-05-03 ENCOUNTER — OFFICE (OUTPATIENT)
Dept: RURAL CLINIC 3 | Facility: CLINIC | Age: 78
End: 2023-05-03
Payer: COMMERCIAL

## 2023-05-03 VITALS
WEIGHT: 189 LBS | HEART RATE: 83 BPM | DIASTOLIC BLOOD PRESSURE: 72 MMHG | SYSTOLIC BLOOD PRESSURE: 111 MMHG | HEIGHT: 68 IN

## 2023-05-03 DIAGNOSIS — K59.00 CONSTIPATION, UNSPECIFIED: ICD-10-CM

## 2023-05-03 DIAGNOSIS — B96.81 HELICOBACTER PYLORI [H. PYLORI] AS THE CAUSE OF DISEASES CLA: ICD-10-CM

## 2023-05-03 PROCEDURE — 99214 OFFICE O/P EST MOD 30 MIN: CPT | Performed by: NURSE PRACTITIONER

## 2023-05-03 RX ORDER — LINACLOTIDE 72 UG/1
CAPSULE, GELATIN COATED ORAL
Qty: 90 | Refills: 3 | Status: COMPLETED
Start: 2023-05-03 | End: 2023-08-30

## 2023-08-30 ENCOUNTER — OFFICE (OUTPATIENT)
Dept: RURAL CLINIC 3 | Facility: CLINIC | Age: 78
End: 2023-08-30
Payer: COMMERCIAL

## 2023-08-30 VITALS
DIASTOLIC BLOOD PRESSURE: 60 MMHG | WEIGHT: 188 LBS | HEIGHT: 68 IN | SYSTOLIC BLOOD PRESSURE: 105 MMHG | HEART RATE: 84 BPM

## 2023-08-30 DIAGNOSIS — K59.00 CONSTIPATION, UNSPECIFIED: ICD-10-CM

## 2023-08-30 DIAGNOSIS — B96.81 HELICOBACTER PYLORI [H. PYLORI] AS THE CAUSE OF DISEASES CLA: ICD-10-CM

## 2023-08-30 PROCEDURE — 99213 OFFICE O/P EST LOW 20 MIN: CPT | Performed by: INTERNAL MEDICINE

## 2024-01-17 RX ORDER — AMITRIPTYLINE HYDROCHLORIDE 25 MG/1
TABLET, FILM COATED ORAL
Qty: 180 TABLET | Refills: 3 | Status: SHIPPED | OUTPATIENT
Start: 2024-01-17

## 2024-02-14 ENCOUNTER — TRANSCRIBE ORDERS (OUTPATIENT)
Dept: ADMINISTRATIVE | Facility: HOSPITAL | Age: 79
End: 2024-02-14
Payer: MEDICARE

## 2024-02-14 DIAGNOSIS — R10.31 RLQ ABDOMINAL PAIN: Primary | ICD-10-CM

## 2024-04-01 ENCOUNTER — TRANSCRIBE ORDERS (OUTPATIENT)
Dept: ADMINISTRATIVE | Facility: HOSPITAL | Age: 79
End: 2024-04-01
Payer: MEDICARE

## 2024-04-01 DIAGNOSIS — G44.001 INTRACTABLE CLUSTER HEADACHE: Primary | ICD-10-CM

## 2024-04-17 ENCOUNTER — HOSPITAL ENCOUNTER (OUTPATIENT)
Dept: MRI IMAGING | Facility: HOSPITAL | Age: 79
Discharge: HOME | End: 2024-04-17
Admitting: NURSE PRACTITIONER
Payer: MEDICARE

## 2024-04-17 DIAGNOSIS — G44.001 INTRACTABLE CLUSTER HEADACHE: ICD-10-CM

## 2024-04-17 PROCEDURE — 70551 MRI BRAIN STEM W/O DYE: CPT

## 2024-05-01 ENCOUNTER — OFFICE (OUTPATIENT)
Dept: RURAL CLINIC 3 | Facility: CLINIC | Age: 79
End: 2024-05-01
Payer: COMMERCIAL

## 2024-05-01 VITALS
HEIGHT: 68 IN | DIASTOLIC BLOOD PRESSURE: 76 MMHG | HEART RATE: 71 BPM | WEIGHT: 192 LBS | SYSTOLIC BLOOD PRESSURE: 124 MMHG

## 2024-05-01 DIAGNOSIS — R93.3 ABNORMAL FINDINGS ON DIAGNOSTIC IMAGING OF OTHER PARTS OF DI: ICD-10-CM

## 2024-05-01 DIAGNOSIS — B96.81 HELICOBACTER PYLORI [H. PYLORI] AS THE CAUSE OF DISEASES CLA: ICD-10-CM

## 2024-05-01 PROCEDURE — 99214 OFFICE O/P EST MOD 30 MIN: CPT | Performed by: INTERNAL MEDICINE

## 2024-05-08 ENCOUNTER — OFFICE VISIT (OUTPATIENT)
Dept: NEUROLOGY | Facility: CLINIC | Age: 79
End: 2024-05-08
Payer: MEDICARE

## 2024-05-08 VITALS
HEART RATE: 74 BPM | HEIGHT: 68 IN | BODY MASS INDEX: 29.01 KG/M2 | SYSTOLIC BLOOD PRESSURE: 119 MMHG | DIASTOLIC BLOOD PRESSURE: 88 MMHG | WEIGHT: 191.4 LBS

## 2024-05-08 DIAGNOSIS — G89.29 CHRONIC INTRACTABLE HEADACHE, UNSPECIFIED HEADACHE TYPE: Primary | ICD-10-CM

## 2024-05-08 DIAGNOSIS — R51.9 CHRONIC INTRACTABLE HEADACHE, UNSPECIFIED HEADACHE TYPE: Primary | ICD-10-CM

## 2024-05-08 DIAGNOSIS — H57.12 RETRO-ORBITAL PAIN OF LEFT EYE: ICD-10-CM

## 2024-05-08 RX ORDER — AMITRIPTYLINE HYDROCHLORIDE 50 MG/1
TABLET, FILM COATED ORAL
Qty: 90 TABLET | Refills: 3 | Status: SHIPPED | OUTPATIENT
Start: 2024-05-08

## 2024-07-10 ENCOUNTER — ON CAMPUS - OUTPATIENT (OUTPATIENT)
Dept: RURAL HOSPITAL 3 | Facility: HOSPITAL | Age: 79
End: 2024-07-10
Payer: COMMERCIAL

## 2024-07-10 DIAGNOSIS — K20.80 OTHER ESOPHAGITIS WITHOUT BLEEDING: ICD-10-CM

## 2024-07-10 DIAGNOSIS — R93.3 ABNORMAL FINDINGS ON DIAGNOSTIC IMAGING OF OTHER PARTS OF DI: ICD-10-CM

## 2024-07-10 DIAGNOSIS — K29.50 UNSPECIFIED CHRONIC GASTRITIS WITHOUT BLEEDING: ICD-10-CM

## 2024-07-10 DIAGNOSIS — K31.89 OTHER DISEASES OF STOMACH AND DUODENUM: ICD-10-CM

## 2024-07-10 DIAGNOSIS — Z86.19 PERSONAL HISTORY OF OTHER INFECTIOUS AND PARASITIC DISEASES: ICD-10-CM

## 2024-07-10 DIAGNOSIS — B96.81 HELICOBACTER PYLORI [H. PYLORI] AS THE CAUSE OF DISEASES CLA: ICD-10-CM

## 2024-07-10 PROCEDURE — 43239 EGD BIOPSY SINGLE/MULTIPLE: CPT | Performed by: INTERNAL MEDICINE

## 2025-04-07 ENCOUNTER — LAB (OUTPATIENT)
Dept: LAB | Facility: HOSPITAL | Age: 80
End: 2025-04-07
Payer: MEDICARE

## 2025-04-07 ENCOUNTER — TRANSCRIBE ORDERS (OUTPATIENT)
Dept: ADMINISTRATIVE | Facility: HOSPITAL | Age: 80
End: 2025-04-07
Payer: MEDICARE

## 2025-04-07 DIAGNOSIS — M25.562 LEFT KNEE PAIN, UNSPECIFIED CHRONICITY: ICD-10-CM

## 2025-04-07 DIAGNOSIS — M25.562 LEFT KNEE PAIN, UNSPECIFIED CHRONICITY: Primary | ICD-10-CM

## 2025-04-07 LAB
BASOPHILS # BLD AUTO: 0.04 10*3/MM3 (ref 0–0.2)
BASOPHILS NFR BLD AUTO: 0.6 % (ref 0–1.5)
CRP SERPL-MCNC: <0.3 MG/DL (ref 0–0.5)
DEPRECATED RDW RBC AUTO: 40.3 FL (ref 37–54)
EOSINOPHIL # BLD AUTO: 0.12 10*3/MM3 (ref 0–0.4)
EOSINOPHIL NFR BLD AUTO: 1.7 % (ref 0.3–6.2)
ERYTHROCYTE [DISTWIDTH] IN BLOOD BY AUTOMATED COUNT: 11.9 % (ref 12.3–15.4)
ERYTHROCYTE [SEDIMENTATION RATE] IN BLOOD: 5 MM/HR (ref 0–20)
HCT VFR BLD AUTO: 39.5 % (ref 37.5–51)
HGB BLD-MCNC: 13.6 G/DL (ref 13–17.7)
IMM GRANULOCYTES # BLD AUTO: 0.02 10*3/MM3 (ref 0–0.05)
IMM GRANULOCYTES NFR BLD AUTO: 0.3 % (ref 0–0.5)
LYMPHOCYTES # BLD AUTO: 1.47 10*3/MM3 (ref 0.7–3.1)
LYMPHOCYTES NFR BLD AUTO: 20.8 % (ref 19.6–45.3)
MCH RBC QN AUTO: 32.2 PG (ref 26.6–33)
MCHC RBC AUTO-ENTMCNC: 34.4 G/DL (ref 31.5–35.7)
MCV RBC AUTO: 93.4 FL (ref 79–97)
MONOCYTES # BLD AUTO: 0.74 10*3/MM3 (ref 0.1–0.9)
MONOCYTES NFR BLD AUTO: 10.5 % (ref 5–12)
NEUTROPHILS NFR BLD AUTO: 4.67 10*3/MM3 (ref 1.7–7)
NEUTROPHILS NFR BLD AUTO: 66.1 % (ref 42.7–76)
NRBC BLD AUTO-RTO: 0 /100 WBC (ref 0–0.2)
PLATELET # BLD AUTO: 259 10*3/MM3 (ref 140–450)
PMV BLD AUTO: 10.1 FL (ref 6–12)
RBC # BLD AUTO: 4.23 10*6/MM3 (ref 4.14–5.8)
WBC NRBC COR # BLD AUTO: 7.06 10*3/MM3 (ref 3.4–10.8)

## 2025-04-07 PROCEDURE — 36415 COLL VENOUS BLD VENIPUNCTURE: CPT

## 2025-04-07 PROCEDURE — 86140 C-REACTIVE PROTEIN: CPT

## 2025-04-07 PROCEDURE — 85652 RBC SED RATE AUTOMATED: CPT

## 2025-04-07 PROCEDURE — 85025 COMPLETE CBC W/AUTO DIFF WBC: CPT

## 2025-05-08 ENCOUNTER — OFFICE VISIT (OUTPATIENT)
Dept: NEUROLOGY | Facility: CLINIC | Age: 80
End: 2025-05-08
Payer: MEDICARE

## 2025-05-08 VITALS
BODY MASS INDEX: 28.7 KG/M2 | WEIGHT: 189.4 LBS | HEIGHT: 68 IN | HEART RATE: 79 BPM | DIASTOLIC BLOOD PRESSURE: 67 MMHG | SYSTOLIC BLOOD PRESSURE: 104 MMHG

## 2025-05-08 DIAGNOSIS — R51.9 CHRONIC INTRACTABLE HEADACHE, UNSPECIFIED HEADACHE TYPE: Primary | ICD-10-CM

## 2025-05-08 DIAGNOSIS — G89.29 CHRONIC INTRACTABLE HEADACHE, UNSPECIFIED HEADACHE TYPE: Primary | ICD-10-CM

## 2025-05-08 NOTE — PROGRESS NOTES
"Chief Complaint  Neurologic Problem    Subjective          Lacho Ayala presents to [facility] NE[facility]ERY  History of Present Illness    History of Present Illness  The patient is an 80-year-old male who presents to the office for follow-up of headache.    He reports a general sense of well-being with no recurrence of headaches. He expresses interest in reducing the dosage of amitriptyline and requests a prescription for two 25 mg tablets daily, equivalent to his current 50 mg dose, to be filled at [facility] for a 90-day supply. He is currently on a regimen of amitriptyline 50 mg nightly.    INTERVAL: Since the last visit, he has continued to take amitriptyline 50 mg nightly with no recurrence of headaches.    MEDICATIONS  CURRENT MEDS:  Amitriptyline 50 mg Oral Once daily  PREVIOUS MEDS:  Amitriptyline 25 mg Oral  Reason for Discontinuation: Continued headaches       Objective   Vital Signs:   /67   Pulse 79   Ht 172.7 cm (68\")   Wt 85.9 kg (189 lb 6.4 oz)   BMI 28.80 kg/m²     Physical Exam  HENT:      Head: Normocephalic.   Pulmonary:      Effort: Pulmonary effort is normal.   Neurological:      Mental Status: He is alert and oriented to person, place, and time.      Sensory: Sensation is intact.      Motor: Motor function is intact.      Coordination: Coordination is intact.      Deep Tendon Reflexes: Reflexes are normal and symmetric.        Neurological Exam  Mental Status  Alert. Oriented to person, place, and time.    Sensory  Normal sensation.    Reflexes  Deep tendon reflexes are 2+ and symmetric in all four extremities.    Coordination    Finger-to-nose, rapid alternating movements and heel-to-shin normal bilaterally without dysmetria.      Result Review :   CBC:  Lab Results   Component Value Date    WBC 7.06 04/07/2025    RBC 4.23 04/07/2025    HGB 13.6 04/07/2025    HCT 39.5 04/07/2025    MCV 93.4 04/07/2025    MCH 32.2 04/07/2025    MCHC 34.4 " 04/07/2025    RDW 11.9 (L) 04/07/2025     04/07/2025     CMP:  Lab Results   Component Value Date    BUN 9 04/11/2022    CREATININE 0.88 04/11/2022     04/11/2022    K 4.0 04/11/2022     04/11/2022    CALCIUM 8.3 (L) 04/11/2022    ALBUMIN 4.30 04/09/2022    BILITOT 0.6 04/09/2022    ALKPHOS 86 04/09/2022    AST 24 04/09/2022    ALT 15 04/09/2022               Assessment and Plan    Diagnoses and all orders for this visit:    1. Chronic intractable headache, unspecified headache type (Primary)    Other orders  -     Discontinue: amitriptyline (ELAVIL) 25 MG tablet; 1 tablet nightly  Dispense: 90 tablet; Refill: 1  -     amitriptyline (ELAVIL) 25 MG tablet; 1 tablet nightly  Dispense: 90 tablet; Refill: 1        Assessment & Plan  1. Headache.  He has been maintaining a regimen of amitriptyline 50 mg nightly, which has effectively managed his headaches. A reduction in the dosage to 25 mg is proposed to assess his tolerance and ensure continued headache control. He is advised to switch to the 25 mg dosage and retain the 50 mg tablets for potential future use. A prescription for amitriptyline 25 mg will be sent to [facility] for a 90-day supply. If he experiences worsening headaches, he should inform the provider so that an alternative prescription can be issued. If his headaches remain under control, further attempts to discontinue the medication will be considered.    Follow-up  The patient is scheduled for a follow-up visit on 10/22/2025.         Follow Up   Return in about 5 months (around 10/22/2025).  Patient was given instructions and counseling regarding his condition or for health maintenance advice. Please see specific information pulled into the AVS if appropriate.       Patient or patient representative verbalized consent for the use of Ambient Listening during the visit with  TRE Witt for chart documentation. 5/8/2025  08:26 EDT

## (undated) DEVICE — PK ENDO GI 50

## (undated) DEVICE — SNAR POLYP HOTSNARE/BRAIDED OVL/MINI 7F 2.8X10MM 230CM 1P/U

## (undated) DEVICE — SINGLE-USE BIOPSY FORCEPS: Brand: RADIAL JAW 4

## (undated) DEVICE — BITEBLOCK ENDO W/STRAP 60F A/ LF DISP